# Patient Record
Sex: MALE | Race: BLACK OR AFRICAN AMERICAN
[De-identification: names, ages, dates, MRNs, and addresses within clinical notes are randomized per-mention and may not be internally consistent; named-entity substitution may affect disease eponyms.]

---

## 2021-08-17 ENCOUNTER — HOSPITAL ENCOUNTER (INPATIENT)
Dept: HOSPITAL 61 - ER | Age: 56
LOS: 2 days | Discharge: ANOTHER HOSPITAL | DRG: 199 | End: 2021-08-19
Attending: FAMILY MEDICINE | Admitting: FAMILY MEDICINE
Payer: SELF-PAY

## 2021-08-17 VITALS — BODY MASS INDEX: 15.87 KG/M2 | WEIGHT: 119.71 LBS | HEIGHT: 73 IN

## 2021-08-17 DIAGNOSIS — U07.1: ICD-10-CM

## 2021-08-17 DIAGNOSIS — J98.4: ICD-10-CM

## 2021-08-17 DIAGNOSIS — J43.9: ICD-10-CM

## 2021-08-17 DIAGNOSIS — Z90.01: ICD-10-CM

## 2021-08-17 DIAGNOSIS — J96.01: ICD-10-CM

## 2021-08-17 DIAGNOSIS — N17.0: ICD-10-CM

## 2021-08-17 DIAGNOSIS — F17.200: ICD-10-CM

## 2021-08-17 DIAGNOSIS — J12.82: ICD-10-CM

## 2021-08-17 DIAGNOSIS — J93.9: Primary | ICD-10-CM

## 2021-08-17 DIAGNOSIS — E43: ICD-10-CM

## 2021-08-17 LAB
% ATYL: 4 % (ref 0–0)
% BANDS: 18 % (ref 0–9)
% LYMPHS: 7 % (ref 24–48)
% MONOS: 1 % (ref 0–10)
% SEGS: 70 % (ref 35–66)
ALBUMIN SERPL-MCNC: 2.8 G/DL (ref 3.4–5)
ALP SERPL-CCNC: 58 U/L (ref 46–116)
ALT SERPL-CCNC: 39 U/L (ref 16–63)
ANION GAP SERPL CALC-SCNC: 11 MMOL/L (ref 6–14)
AST SERPL-CCNC: 53 U/L (ref 15–37)
BASOPHILS # BLD AUTO: 0 X10^3/UL (ref 0–0.2)
BASOPHILS NFR BLD: 0 % (ref 0–3)
BILIRUB DIRECT SERPL-MCNC: 0.1 MG/DL (ref 0–0.2)
BILIRUB SERPL-MCNC: 0.3 MG/DL (ref 0.2–1)
BUN SERPL-MCNC: 39 MG/DL (ref 8–26)
CALCIUM SERPL-MCNC: 9.4 MG/DL (ref 8.5–10.1)
CHLORIDE SERPL-SCNC: 97 MMOL/L (ref 98–107)
CO2 SERPL-SCNC: 28 MMOL/L (ref 21–32)
CREAT SERPL-MCNC: 1.5 MG/DL (ref 0.7–1.3)
EOSINOPHIL NFR BLD: 0 % (ref 0–3)
EOSINOPHIL NFR BLD: 0 X10^3/UL (ref 0–0.7)
ERYTHROCYTE [DISTWIDTH] IN BLOOD BY AUTOMATED COUNT: 14.8 % (ref 11.5–14.5)
GFR SERPLBLD BASED ON 1.73 SQ M-ARVRAT: 58.6 ML/MIN
GLUCOSE SERPL-MCNC: 132 MG/DL (ref 70–99)
HCT VFR BLD CALC: 52.9 % (ref 39–53)
HGB BLD-MCNC: 18 G/DL (ref 13–17.5)
LYMPHOCYTES # BLD: 0.8 X10^3/UL (ref 1–4.8)
LYMPHOCYTES NFR BLD AUTO: 7 % (ref 24–48)
MCH RBC QN AUTO: 28 PG (ref 25–35)
MCHC RBC AUTO-ENTMCNC: 34 G/DL (ref 31–37)
MCV RBC AUTO: 82 FL (ref 79–100)
MONO #: 0.5 X10^3/UL (ref 0–1.1)
MONOCYTES NFR BLD: 5 % (ref 0–9)
NEUT #: 10.7 X10^3/UL (ref 1.8–7.7)
NEUTROPHILS NFR BLD AUTO: 89 % (ref 31–73)
PLATELET # BLD AUTO: 170 X10^3/UL (ref 140–400)
PLATELET # BLD EST: ADEQUATE 10*3/UL
POTASSIUM SERPL-SCNC: 4 MMOL/L (ref 3.5–5.1)
PROT SERPL-MCNC: 8.1 G/DL (ref 6.4–8.2)
RBC # BLD AUTO: 6.46 X10^6/UL (ref 4.3–5.7)
SODIUM SERPL-SCNC: 136 MMOL/L (ref 136–145)
WBC # BLD AUTO: 12.1 X10^3/UL (ref 4–11)

## 2021-08-17 PROCEDURE — 71045 X-RAY EXAM CHEST 1 VIEW: CPT

## 2021-08-17 PROCEDURE — 96375 TX/PRO/DX INJ NEW DRUG ADDON: CPT

## 2021-08-17 PROCEDURE — 80076 HEPATIC FUNCTION PANEL: CPT

## 2021-08-17 PROCEDURE — 36415 COLL VENOUS BLD VENIPUNCTURE: CPT

## 2021-08-17 PROCEDURE — 87426 SARSCOV CORONAVIRUS AG IA: CPT

## 2021-08-17 PROCEDURE — 96361 HYDRATE IV INFUSION ADD-ON: CPT

## 2021-08-17 PROCEDURE — 80048 BASIC METABOLIC PNL TOTAL CA: CPT

## 2021-08-17 PROCEDURE — 83880 ASSAY OF NATRIURETIC PEPTIDE: CPT

## 2021-08-17 PROCEDURE — 80053 COMPREHEN METABOLIC PANEL: CPT

## 2021-08-17 PROCEDURE — G0378 HOSPITAL OBSERVATION PER HR: HCPCS

## 2021-08-17 PROCEDURE — 71260 CT THORAX DX C+: CPT

## 2021-08-17 PROCEDURE — 84484 ASSAY OF TROPONIN QUANT: CPT

## 2021-08-17 PROCEDURE — 93005 ELECTROCARDIOGRAM TRACING: CPT

## 2021-08-17 PROCEDURE — 96365 THER/PROPH/DIAG IV INF INIT: CPT

## 2021-08-17 PROCEDURE — 32551 INSERTION OF CHEST TUBE: CPT

## 2021-08-17 PROCEDURE — 85007 BL SMEAR W/DIFF WBC COUNT: CPT

## 2021-08-17 PROCEDURE — 85025 COMPLETE CBC W/AUTO DIFF WBC: CPT

## 2021-08-17 NOTE — RAD
CT chest with contrast



PQRS statement: CT scans at this facility use dose reduction including either automated exposure cont
rol, iterative reconstructions, and /or weight based radiation dosing via mA and kV modification when
 appropriate to reduce radiation dose to as low as reasonably achievable.



Contrast: 60 mL Omnipaque 300 intravenous contrast



HISTORY: Persistent pneumothorax, positive Covid infection, ruptured pneumatocele.



COMPARISON: Prior chest x-ray from 1/17/2021



FINDINGS: There is a right thoracostomy tube in place. There is a massive right-sided pneumothorax wi
th collapse of most of the lung, and leftward shift of heart and mediastinum suggesting tension pneum
othorax, this is similar to recent chest x-rays which have been previously reported. There is pulmona
ry emphysema bilaterally. There is a portion of the right upper lobe anterior segment which demonstra
zuleyka some thin linear bands extending to the posterolateral chest wall could represent some adhesions,
 there is no obvious subpleural bulla with rupture across the visceral pleura to identify a potential
 source of this spontaneous pneumothorax. There is opacity of the collapsed right middle lobe and rig
ht upper lobe surrounding the jax. The left lung is aerated, demonstrates mild groundglass opacities
 at the upper and lower lobes with some areas of greater consolidated density along the posterior lat
eral basilar lower lobe. Trachea and bronchi are unremarkable. This volume of subcentimeter in thickn
ess right pleural fluid at the lower chest. Heart size normal. Aorta, pulmonary vessels and esophagus
 unremarkable. No adenopathy in the chest. Bones are unremarkable.



IMPRESSION:

1. Right thoracostomy tube in place with a persistent large right-sided pneumothorax, partial collaps
e of most of the right lung, and persistent leftward shift of the heart and mediastinum stable to rec
ently reported chest x-rays as described above.

2. Opacities of portions of the right middle lobe and right upper lobe could be due to atelectasis or
 multilobar pneumonia.  

3. Pulmonary emphysema.

 



Electronically signed by: Vargas Nickerson MD (8/17/2021 11:40 PM) [facility]

## 2021-08-17 NOTE — PHYS DOC
Past Medical History


Past Medical History:  No Pertinent History


 (JULIO CESAR MOREIRA DO)


Past Surgical History:  No Surgical History, Other


Additional Past Surgical Histo:  L eye removed


 (JULIO CESAR MOREIRA DO)


Smoking Status:  Never Smoker


Alcohol Use:  None


Drug Use:  None


 (JULIO CESAR MOREIRA DO)





General Adult


EDM:


Chief Complaint:  SHORTNESS OF BREATH





HPI:


HPI:


56-year-old male presents to the emergency department with shortness of breath 

for the last several days worsening today.  He reports that he was recently exp

osed to his wife who was Covid positive last week.  He also admits to a cough 

that is productive.  He denies any chest pain currently.  He further denies any 

trauma or injuries.  The patient denies nausea, vomiting, fever, chills,  

abdominal pain, urinary symptoms,  recent trauma, or any other complaints.  The 

patient denies any history of blood clots in his legs or his lungs, no personal 

history of any cancers or clotting disorders. 


 (JULIO CESAR MOREIRA DO)





Review of Systems:


Review of Systems:


Constitutional: Denies fever or chills. 


Eyes: Denies change in vision, pain.


HENT: Denies congestion or sore throat.


Respiratory: Admits to cough and shortness of breath. 


Cardiovascular: Denies chest pain or edema. 


GI: Denies abdominal pain, nausea. 


: Denies change in urination, dysuria. 


Musculoskeletal: Denies extremity pain, or trauma. 


Skin: Denies rash, skin change. 


Neurologic: Denies headache, focal weakness. 


Psychiatric: Denies depression or anxiety.





All other systems reviewed as negative except for what was mentioned in the HPI.


 (JULIO CESAR MOREIRA DO)





Heart Score:


C/O Chest Pain:  No


 (JULIO CESAR MOREIRA DO)


C/O Chest Pain:  N/A


 (BETTYENIKIA I DO)


C/O Chest Pain:  N/A


 (DANIEL ALLEN T DO)


Family History:


Family History:


Noncontributory


 (JULIO CESAR MOREIRA DO)





Allergies:


Allergies:





Allergies








Coded Allergies Type Severity Reaction Last Updated Verified


 


  No Known Drug Allergies    21 No








 (JULIO CESAR MOREIRA DO)





Physical Exam:


PE:


Constitutional: No acute distress, non-toxic appearance.  Speaking full 

sentences


HENT: Atraumatic, bilateral external ears normal, nose normal.


Eyes: PERRLA, EOMI, conjunctiva normal, no discharge.


Neck: Normal range of motion, supple, no stridor.


Cardiovascular: Heart rate regular rhythm. 2+ radial pulses


Lungs & Thorax: No respiratory distress, symmetrical expansion. 


Abdomen: Soft, no tenderness


Skin: Warm, dry.


Extremities: No tenderness, no cyanosis, ROM intact, no edema.


Neurologic: Alert and oriented X 3, normal motor function, normal sensory 

function, no focal deficits noted. GCS 15.


Psychologic: Affect normal, judgment normal, mood normal.


 (JULIO CESAR MOREIRA DO)





Current Patient Data:


Vital Signs:





                                   Vital Signs








  Date Time  Temp Pulse Resp B/P (MAP) Pulse Ox O2 Delivery O2 Flow Rate FiO2


 


21 17:23 97.9 111 18 106/64 64 Room Air  





 97.9       








 (JULIO CESAR MOREIRA DO)





EKG:


EKG:


Sinus tachycardia rate of 113, peak T waves seen in the lateral and septal 

precordial leads, no ectopic beats, normal axis, normal MT, QRS, and QTc 

intervals. Impression: Peak T waves, possible hyperkalemic changes.  Interpreted

by me, Julio Cesar Moreira D.O. 


 (JULIOC ESAR MOREIRA DO)





Radiology/Procedures:


Radiology/Procedures:


[]


 (JULIO CESAR MOREIRA DO)


Impression:


Findings:





Single upright portable exam of the chest was performed. Heart and mediastinal 

contours within normal limits. There is a large pneumothorax on the right with 

mediastinal shift toward the left. Prominent reticular nodular markings 

throughout the left lung. No pleural effusion. No left-sided pneumothorax.





IMPRESSION:


1. Large right-sided pneumothorax with mediastinal shift, suggesting tension 

pneumothorax.


2. Left-sided airspace disease, atelectasis versus edema or pneumonia.











CT chest with contrast





PQRS statement: CT scans at this facility use dose reduction including either 

automated exposure control, iterative reconstructions, and /or weight based 

radiation dosing via mA and kV modification when appropriate to reduce radiation

 dose to as low as reasonably achievable.





Contrast: 60 mL Omnipaque 300 intravenous contrast





HISTORY: Persistent pneumothorax, positive Covid infection, ruptured 

pneumatocele.





COMPARISON: Prior chest x-ray from 2021





FINDINGS: There is a right thoracostomy tube in place. There is a massive right-

sided pneumothorax with collapse of most of the lung, and leftward shift of 

heart and mediastinum suggesting tension pneumothorax, this is similar to recent

 chest x-rays which have been previously reported. There is pulmonary emphysema 

bilaterally. There is a portion of the right upper lobe anterior segment which 

demonstrates some thin linear bands extending to the posterolateral chest wall 

could represent some adhesions, there is no obvious subpleural bulla with 

rupture across the visceral pleura to identify a potential source of this 

spontaneous pneumothorax. There is opacity of the collapsed right middle lobe 

and right upper lobe surrounding the jax. The left lung is aerated, 

demonstrates mild groundglass opacities at the upper and lower lobes with some 

areas of greater consolidated density along the posterior lateral basilar lower 

lobe. Trachea and bronchi are unremarkable. This volume of subcentimeter in 

thickness right pleural fluid at the lower chest. Heart size normal. Aorta, 

pulmonary vessels and esophagus unremarkable. No adenopathy in the chest. Bones 

are unremarkable.





IMPRESSION:


1. Right thoracostomy tube in place with a persistent large right-sided 

pneumothorax, partial collapse of most of the right lung, and persistent 

leftward shift of the heart and mediastinum stable to recently reported chest x-

rays as described above.


2. Opacities of portions of the right middle lobe and right upper lobe could be 

due to atelectasis or multilobar pneumonia.  


3. Pulmonary emphysema.


 


 (NIKIA BRADFORD DO)


Radiology/Procedures:


                                        


                                        


                                        


                            [facility]


                    [address]  [place], [place] [id number] [phone]


                                        


                                 IMAGING REPORT





                                     Signed





PATIENT: KHADIJAH SAUL  ACCOUNT: [id number]     MRN#: [id number]


: 1965           LOCATION: ER              AGE: 56


SEX: M                    EXAM DT: 21         ACCESSION#: [id number]


STATUS: REG ER            ORD. PHYSICIAN: DANIEL ALLEN DO


REASON: RIGHT SIDE PNEUMOTHORAX, CHEST TUBE IN PLACE


PROCEDURE: CHEST AP ONLY





XR CHEST 1V





History: Reason: RIGHT SIDE PNEUMOTHORAX, CHEST TUBE IN PLACE / Spl. 

Instructions:  / History: 





Comparison: 2021





Findings:


Decreased right pneumothorax with large residual. Right thoracostomy tube 

projecting over the mid chest. Diffuse interstitial changes. Small right pleural

 effusion, increased compared to prior. Unchanged heart size. Decreased leftward

 mediastinal shift. Increased right chest wall subcutaneous gas.





Impression: 


1.  Decreased right pneumothorax with decreased leftward mediastinal shift. 

Large residual pneumothorax. Interval repositioning of right thoracostomy tube.


2.  Increased small right pleural effusion.





Electronically signed by: Mingo Kincaid DO (2021 9:07 AM) [id number]














DICTATED and SIGNED BY:     MINGO KINCAID DO


DATE:     21 [id number]








                            [facility]


                    [address] [place] [address]


                                 [phone]


                                        


                                 IMAGING REPORT





                                     Signed





PATIENT: KHADIJAH SAUL  ACCOUNT: [id number]     MRN#: [id number]


: 1965           LOCATION: ER              AGE: 56


SEX: M                    EXAM DT: 21         ACCESSION#: [id number]


STATUS: REG ER            ORD. PHYSICIAN: DANIEL ALLEN DO


REASON: right side pneumothorax, s/p chest tube placement, evaluate for 

improvement


PROCEDURE: CHEST AP ONLY





XR CHEST 1V





Clinical Indication: Reason: right side pneumothorax, s/p chest tube placement, 

evaluate for improvement / 





Comparison:  AP chest, earlier same day.





Findings: 


Mid hemithorax right chest tube is in similar position. Large right pneumothorax

 but mildly improved from prior study. Diffuse left lung interstitial opacities 

are unchanged. Leftward mediastinal shift is mildly improved. Right chest wall 

subcutaneous air is similar, incompletely imaged. Small right pleural effusion 

is unchanged.





IMPRESSION: 


1.  Stable right chest tube. Large pneumothorax is mildly improved from prior 

study. Leftward mediastinal shift is mildly improved. Small right pleural 

effusion is unchanged.


2.  Diffuse left lung interstitial opacities are unchanged.








Electronically signed by: Bogdan Delarosa MD (2021 2:56 PM) [id number]














DICTATED and SIGNED BY:     BOGDAN DELAROSA MD


DATE:     21 [id number] 0


 (DANIEL ALLEN DO)


Course & Med Decision Making:


Course & Med Decision Making


The patient was seen emergently by me due to a hypoxic SPO2 reading in triage of

 64% with good waveform at 1730.  The patient was placed on a nonrebreather at 

15 L which improved his O2 saturations.  His initial EKG showed sinus 

tachycardia with possible hyperkalemic changes.  2 g of calcium gluconate were 

ordered.  Patient was signed out to Dr. Bradford at 1800 pending workup and likely

 admisison





Critical care time was 30 minutes which includes time at bedside, spent in d

iscussion of patient's care with specialists and/or family members, with 

interpretation of laboratory and/or radiological studies and is exclusive of 

procedures.





Upon chart review, patient with large PTX likely causing his hypoxia, 

complicated by COVID. CXR was not immediately available to review prior to my 

sign out to oncoming physician. Per documentation, radiologist discussed 

critical finding with Dr. Bradford at 1806


 (JULIO CESAR MOREIRA DO)


Course & Med Decision Making


Assumed care at shift change- Disposition with probable admission.


Labs and radiologic imaging pending at shift change.





Discussed Xray finds with Radiologist.


Large right pneumothorax- suggestive of tension.


Emergent pigtail chest tube placed.








Procedure-


Procedure #1


Pigtail chest tube prepackaged kit used


RIght mid axillary line lateral to right nipple


Betadine was used to clean the area.  Sterile towels were placed around site.  I

 did also use


Lidocaine with EPI used for local anesthesia.


Once successful anesthesia was achieved an incision that with was made over the 

fourth lateral rib.


Dissection down to the rib with finger.


Used needle chest tube introducer to place tube in the right chest.


Introducer was removed.


Pigtail catheter was sutured to the chest using device


Petroleum jelly dressing placed


Repeat chest xray post placement.


Per in acute change in pneumothorax





Procedure #2


Pigtail chest tube prepackaged kit used


RIght mid axillary line lateral to right nipple





Betadine was used to clean the area.  Sterile towels were placed around site. 


Lidocaine with EPI used for local anesthesia.


Once successful anesthesia was achieved 


Previous pigtail chest tube removed.


Dissection down to the rib with finger.


2nd pigtail cath chest tube placed--  redirected posteriorly.


Introducer was removed.


Pigtail catheter was sutured to the chest using device


Petroleum jelly dressing placed


Pigtail placed to vacuum seal


-- No air leak,-- no bubbling


Repeat chest xray post placement.


2nd Chest Xray no acute change in pneumothorax





Procedure #3


Chest tube kit- hospital pre-made


24FR chest tube





Betadine was used to clean the area.  


Sterile towels were placed around site. 


Sterile prepackaged site became in the package. 


Lidocaine with EPI used for local anesthesia. 


Once successful anesthesia was achieved  


Previous pigtail chest tube #2 was removed 


Dissection down to the rib with finger


Positive air flow out chest wall


Chest tube placed 23FR 


Condensation in tube.


Petroleum jelly dressing placed


Chest tube placed to vacuum seal


- No air leak,-- no bubbling


Repeat chest xray post placement.


2nd Chest Xray no acute change in pneumothorax





Discussed patient pulmonology-- Dr Brito-


Recommends transfer to facility that has Thoracic Surgery Capability.





Attempted Transfer-


[facility] - declined transfer due to capacity


[facility] - declined transfer due to capacity


[facility] - declined transfer due to capacity


[facility] - transfer due to capacity


[facility] - No CT surgery





[facility]-- Discussed patient with Hospitalist NP-


Willing to accept patient but currently does not have a COVID bed available.








0028hrs-


Patients stable.


Vital signs HR 74 bmp


O2 95% on 15L NRB


Reviewed CT Chest--


CT Chest-- no acute change when compared to last CXR.


NS 1L added.








 0145hrs-


Re-evaluated


Patient stable with no complaints


15L NRB 96%


Chest tube connected to suction @ 20.


Patient states breathing improved when placed to suction.


Accepting hospital pending.








0435hrs


Re-evaluation


Patient stable


15LNrb 94%


Heart rate 68


Chest tube connected to suction @ 20


Patient with no complaints





Contacted for transfer (initial)


[facility] - declined transfer due to capacity


[facility] - declined transfer due to capacity


[facility]- declined transfer due to capacity


[facility] - declined transfer due to capacity





RE- Contacted hospitals for transfer


[facility] - declined transfer due to capacity 


[facility] - declined transfer due to capacity


[facility] - declined transfer due to capacity


[facility] - transfer due to capacity














0544hrs


Re-evaluation


Patient stable


15LNrb 97%


Heart rate 74


Chest tube connected to suction @ 20


Patient with no complaints


Placement/Transfer pending.


Patient signed out to Dr Allen











Re-ASSUMED Care at shift change


1800HRS


Per signed out from Dr Allen patient to be accepted by [facility].


I called [facility] to give report--- Discussed patient with House Supervisor and she 

is requesting ER Documentation.


ER Documentation fax to number provided.





5728hrs


House supervisor informed me that patient not accepted to [facility].


Will need to Admit here to [facility]





1845hrs


Discussed patient with Marina CARDENAS-- Plan to admit patient to [facility] then 

will do a hospital to hospital 


transfer to [facility] once bed is available.


 (NIKIA BRADFORD I DO)


Course & Med Decision Making


I assumed care of this patient as 6 AM from Dr. Nikia Bradford, awaiting for 

transfer to another facility due to large right side pneumothorax that is not 

resolving with chest tube placement, need cardiothoracis surgery evaluation.  





At 8AM, discussed with Dr. Preez's nurse, cardiothoracic surgeon at [facility], 

TOLD THAT HE IS IN THE OR AT THIS TIME, NOT ABLE TO DISCUSS, WILL TAKE DOWN 

INFORMATION, HE WILL CALL WHENEVER HE IS OUT OF THE OR.





Repeated chest xray shown improved lung size but still has a large pneumothorax,

 discussed with pulmonologist, Dr. BRITO, who recommended to keep patient in the 

ER, need to transfer to another facility with cardiothoracis surgery.  





AT 11:17, This physician called [facility] for transfer, there is no 

bed available so transfer was declined. 





CALLED [facility], transfer center at  310 pm, discussed about 

availability of accepting patient there, informed that they have no COVID-19 ICU

 bed, declined transfer at this time.  





at 5:30 PM ON 21 I discussed with the cardiothoracic surgeon at [facility], 

Dr. Favio Alberto who recommended to advance the chest tube to apical, willing to 

see patient at [facility] IF THE HOSPITALIST ACCEPT PATIENT FOR TRANSFER THERE.  IF

 THERE IS NO BED, PATIENT CAN BE ADMITTED HERE AND THEN TRANSFER LATER.  





I discussed with Dr. Brito, pulmonologist here, agreed to see patient if he is 

admitted here.





I discussed with Dr. Gonzalo Santos, hospitalist, agreed to admit patient here.





At 6:10 pm, house supervisor [facility] Marina Gut[facility]rez, 

Informed that [facility] may have a bed for patient....








Patient's chest tube was advanced by this physician to apical position..





Patient's care is endorsed to Dr. Nikia Bradford at shift change, awaiting 

transfer to [facility]. 





 (DANIEL ALLEN DO)





Departure


Departure


Impression:  


   Primary Impression:  


   Hypoxia


   Additional Impressions:  


   COVID


   Pneumothorax on right


Disposition:  09 ADMITTED AS INPATIENT


Admitting Physician:  HIMS


 (JULIO CESAR MOREIRA DO)











JULIO CESAR MOREIRA DO             Aug 17, 2021 17:46


NIKIA BRADFORD DO            Aug 17, 2021 18:51


DANIEL ALLEN DO                Aug 18, 2021 11:24

## 2021-08-17 NOTE — RAD
Single view chest dated 8/17/2021 6:03 PM:



COMPARISON: None



Clinical Indication: Shortness of breath.



Findings:



Single upright portable exam of the chest was performed. Heart and mediastinal contours within normal
 limits. There is a large pneumothorax on the right with mediastinal shift toward the left. Prominent
 reticular nodular markings throughout the left lung. No pleural effusion. No left-sided pneumothorax
.



IMPRESSION:

1. Large right-sided pneumothorax with mediastinal shift, suggesting tension pneumothorax.

2. Left-sided airspace disease, atelectasis versus edema or pneumonia.





**Results discussed with ER physician at approximately 6:06 PM on the day of the study.



Electronically signed by: Antonio Rai MD (8/17/2021 6:06 PM) AMAN

## 2021-08-17 NOTE — RAD
Single view chest dated 8/17/2021 7:58 PM:



COMPARISON: Study dated same day.



Clinical Indication: Chest tube placement.



Findings:



Single upright portable exam of the chest was performed. Large right-sided pneumothorax with complete
 collapse of the right lung, unchanged. There is mild mediastinal shift to the right. Smallbore chest
 tube has slightly changed in position, however the pneumothorax is not significant changed in size. 
Patchy airspace disease throughout the left lung, stable.



IMPRESSION:

1. No significant interval change in large right-sided pneumothorax after chest tube adjustment.



Electronically signed by: Antonio Rai MD (8/17/2021 8:01 PM) AMAN

## 2021-08-17 NOTE — RAD
Chest AP only at 2025:



Reason for examination: Chest tube placement.



Comparison is made to previous study dated 8/17/2021 at 1934.



A large bore right chest tube is now present. There continues to be a large right pneumothorax with c
ollapse of the right lung which is unchanged. There continue to be interstitial and alveolar opacitie
s throughout the left lung field which show no change. The mediastinum shows a slight shift of midlin
e to the left. No pleural effusions are seen. The heart size is normal. No acute bony abnormalities a
re seen.



IMPRESSION:



Large right pneumothorax which is unchanged despite the large bore chest tube. 

Collapse of the right lung field with mild shift of mediastinum to the left suggesting tension pneumo
thorax.

Continued presence of diffuse opacities throughout the left lung field without interval change.



Electronically signed by: Meghan Goodson MD (8/17/2021 8:49 PM) RACHELL

## 2021-08-17 NOTE — RAD
Exam: Chest one view



INDICATION: Chest tube placement



TECHNIQUE: Frontal view of the chest



Comparisons: None



FINDINGS:

Interval placement of a right-sided chest tube. There is persistent large right pneumothorax. There m
ay be mild decreased mediastinal shift with persistent complete collapse of the right lung.



IMPRESSION:

Interval placement of a chest tube with persistent complete collapse of the right lung. Exam is essen
tially unchanged from prior. Suspect subcutaneous placement of the chest tube.





**********FOR INTERNAL CODING PURPOSES**********



Critical result:



Findings discussed with  ER physician at 8/17/2021 6:53 PM.



RESULT CODE: (C)  



  







Electronically signed by: Sravan Tomlinson MD (8/17/2021 6:53 PM) MAGDALENA

## 2021-08-18 VITALS — DIASTOLIC BLOOD PRESSURE: 70 MMHG | SYSTOLIC BLOOD PRESSURE: 103 MMHG

## 2021-08-18 VITALS — SYSTOLIC BLOOD PRESSURE: 115 MMHG | DIASTOLIC BLOOD PRESSURE: 83 MMHG

## 2021-08-18 PROCEDURE — 0W9930Z DRAINAGE OF RIGHT PLEURAL CAVITY WITH DRAINAGE DEVICE, PERCUTANEOUS APPROACH: ICD-10-PCS | Performed by: STUDENT IN AN ORGANIZED HEALTH CARE EDUCATION/TRAINING PROGRAM

## 2021-08-18 NOTE — EKG
[facility]

              [address] [place] [address]

Test Date:    2021               Test Time:    17:33:05

Pat Name:     KHADIJAH SAUL          Department:   

Patient ID:   [id number]           Room:          

Gender:       M                        Technician:   

:          1965               Requested By: CHRISTINE BROOKS

Order Number: [id number]           Reading MD:     

                                 Measurements

Intervals                              Axis          

Rate:         113                      P:            96

SC:           140                      QRS:          100

QRSD:         86                       T:            69

QT:           310                                    

QTc:          431                                    

                           Interpretive Statements

SINUS TACHYCARDIA

RIGHTWARD AXIS

R-S TRANSITION ZONE IN V LEADS DISPLACED TO THE LEFT

NO SPECIFIC ECG ABNORMALITIES

RI6.02

No previous ECG available for comparison

## 2021-08-18 NOTE — RAD
XR CHEST 1V



History: Reason: RIGHT SIDE PNEUMOTHORAX, CHEST TUBE IN PLACE / Spl. Instructions:  / History: 



Comparison: August 17, 2021



Findings:

Decreased right pneumothorax with large residual. Right thoracostomy tube projecting over the mid micheal
st. Diffuse interstitial changes. Small right pleural effusion, increased compared to prior. Unchange
d heart size. Decreased leftward mediastinal shift. Increased right chest wall subcutaneous gas.



Impression: 

1.  Decreased right pneumothorax with decreased leftward mediastinal shift. Large residual pneumothor
ax. Interval repositioning of right thoracostomy tube.

2.  Increased small right pleural effusion.



Electronically signed by: Mingo Kincaid DO (8/18/2021 9:07 AM) [id number]
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
## 2021-08-18 NOTE — RAD
Single view chest dated 8/18/2021 6:31 PM:



COMPARISON: 8/18/2021



Clinical Indication: Chest tube advancement.



Findings:



Single upright portable exam of the chest was performed. Heart and mediastinal contours are stable. T
here has been interval repositioning of right-sided chest tube. Moderate size right-sided pneumothora
x is similar in size given differences in technique. Subcutaneous emphysema at the lower right neck a
nd right chest wall, mildly increased. There is patchy airspace disease throughout the left lung, unc
hanged.



IMPRESSION:

1. No significant interval change in moderate size right pneumothorax is seen.

2. Left-sided airspace disease, unchanged.



Electronically signed by: Antonio Rai MD (8/18/2021 6:32 PM) AMAN

## 2021-08-18 NOTE — RAD
XR CHEST 1V



Clinical Indication: Reason: right side pneumothorax, s/p chest tube placement, evaluate for improvem
ent / 



Comparison:  AP chest, earlier same day.



Findings: 

Mid hemithorax right chest tube is in similar position. Large right pneumothorax but mildly improved 
from prior study. Diffuse left lung interstitial opacities are unchanged. Leftward mediastinal shift 
is mildly improved. Right chest wall subcutaneous air is similar, incompletely imaged. Small right pl
eural effusion is unchanged.



IMPRESSION: 

1.  Stable right chest tube. Large pneumothorax is mildly improved from prior study. Leftward mediast
inal shift is mildly improved. Small right pleural effusion is unchanged.

2.  Diffuse left lung interstitial opacities are unchanged.





Electronically signed by: Bogdan Delarosa MD (8/18/2021 2:56 PM) [id number]

## 2021-08-19 ENCOUNTER — HOSPITAL ENCOUNTER (INPATIENT)
Dept: HOSPITAL 35 - 3W | Age: 56
LOS: 22 days | Discharge: HOME HEALTH | DRG: 163 | End: 2021-09-10
Attending: HOSPITALIST | Admitting: HOSPITALIST
Payer: COMMERCIAL

## 2021-08-19 VITALS — DIASTOLIC BLOOD PRESSURE: 69 MMHG | SYSTOLIC BLOOD PRESSURE: 103 MMHG

## 2021-08-19 VITALS — SYSTOLIC BLOOD PRESSURE: 106 MMHG | DIASTOLIC BLOOD PRESSURE: 75 MMHG

## 2021-08-19 VITALS — SYSTOLIC BLOOD PRESSURE: 123 MMHG | DIASTOLIC BLOOD PRESSURE: 71 MMHG

## 2021-08-19 VITALS — DIASTOLIC BLOOD PRESSURE: 70 MMHG | SYSTOLIC BLOOD PRESSURE: 107 MMHG

## 2021-08-19 VITALS — SYSTOLIC BLOOD PRESSURE: 130 MMHG | DIASTOLIC BLOOD PRESSURE: 85 MMHG

## 2021-08-19 VITALS — HEIGHT: 72.99 IN | WEIGHT: 127.4 LBS | BODY MASS INDEX: 16.89 KG/M2

## 2021-08-19 VITALS — SYSTOLIC BLOOD PRESSURE: 128 MMHG | DIASTOLIC BLOOD PRESSURE: 71 MMHG

## 2021-08-19 DIAGNOSIS — Z80.3: ICD-10-CM

## 2021-08-19 DIAGNOSIS — J12.82: ICD-10-CM

## 2021-08-19 DIAGNOSIS — U07.1: Primary | ICD-10-CM

## 2021-08-19 DIAGNOSIS — F17.210: ICD-10-CM

## 2021-08-19 DIAGNOSIS — J96.21: ICD-10-CM

## 2021-08-19 DIAGNOSIS — R79.89: ICD-10-CM

## 2021-08-19 DIAGNOSIS — Z79.899: ICD-10-CM

## 2021-08-19 DIAGNOSIS — J44.0: ICD-10-CM

## 2021-08-19 DIAGNOSIS — D72.829: ICD-10-CM

## 2021-08-19 DIAGNOSIS — I47.1: ICD-10-CM

## 2021-08-19 DIAGNOSIS — E43: ICD-10-CM

## 2021-08-19 DIAGNOSIS — I10: ICD-10-CM

## 2021-08-19 DIAGNOSIS — D64.9: ICD-10-CM

## 2021-08-19 DIAGNOSIS — J93.0: ICD-10-CM

## 2021-08-19 LAB
ALBUMIN SERPL-MCNC: 2.3 G/DL (ref 3.4–5)
ALBUMIN SERPL-MCNC: 2.4 G/DL (ref 3.4–5)
ALBUMIN/GLOB SERPL: 0.5 {RATIO} (ref 1–1.7)
ALP SERPL-CCNC: 59 U/L (ref 46–116)
ALT SERPL-CCNC: 48 U/L (ref 16–63)
ALT SERPL-CCNC: 54 U/L (ref 30–65)
ANION GAP SERPL CALC-SCNC: 10 MMOL/L (ref 6–14)
ANION GAP SERPL CALC-SCNC: 8 MMOL/L (ref 7–16)
AST SERPL-CCNC: 53 U/L (ref 15–37)
AST SERPL-CCNC: 54 U/L (ref 15–37)
BASOPHILS # BLD AUTO: 0 X10^3/UL (ref 0–0.2)
BASOPHILS NFR BLD: 0 % (ref 0–3)
BILIRUB SERPL-MCNC: 0.3 MG/DL (ref 0.2–1)
BILIRUB SERPL-MCNC: 0.3 MG/DL (ref 0.2–1)
BUN SERPL-MCNC: 23 MG/DL (ref 7–18)
BUN SERPL-MCNC: 23 MG/DL (ref 8–26)
BUN/CREAT SERPL: 29 (ref 6–20)
CALCIUM SERPL-MCNC: 8.9 MG/DL (ref 8.5–10.1)
CALCIUM SERPL-MCNC: 9.4 MG/DL (ref 8.5–10.1)
CHLORIDE SERPL-SCNC: 102 MMOL/L (ref 98–107)
CHLORIDE SERPL-SCNC: 106 MMOL/L (ref 98–107)
CO2 SERPL-SCNC: 28 MMOL/L (ref 21–32)
CO2 SERPL-SCNC: 29 MMOL/L (ref 21–32)
CREAT SERPL-MCNC: 0.8 MG/DL (ref 0.7–1.3)
CREAT SERPL-MCNC: 0.9 MG/DL (ref 0.7–1.3)
EOSINOPHIL NFR BLD: 0 % (ref 0–3)
EOSINOPHIL NFR BLD: 0 X10^3/UL (ref 0–0.7)
ERYTHROCYTE [DISTWIDTH] IN BLOOD BY AUTOMATED COUNT: 14.8 % (ref 10.5–14.5)
ERYTHROCYTE [DISTWIDTH] IN BLOOD BY AUTOMATED COUNT: 15 % (ref 11.5–14.5)
GFR SERPLBLD BASED ON 1.73 SQ M-ARVRAT: 121 ML/MIN
GLUCOSE SERPL-MCNC: 115 MG/DL (ref 70–99)
GLUCOSE SERPL-MCNC: 116 MG/DL (ref 74–106)
HCT VFR BLD CALC: 45.1 % (ref 42–52)
HCT VFR BLD CALC: 46.6 % (ref 39–53)
HGB BLD-MCNC: 14.8 GM/DL (ref 14–18)
HGB BLD-MCNC: 15.5 G/DL (ref 13–17.5)
LYMPHOCYTES # BLD: 0.8 X10^3/UL (ref 1–4.8)
LYMPHOCYTES NFR BLD AUTO: 7 % (ref 24–48)
MCH RBC QN AUTO: 27 PG (ref 25–35)
MCH RBC QN AUTO: 27 PG (ref 26–34)
MCHC RBC AUTO-ENTMCNC: 32.8 G/DL (ref 28–37)
MCHC RBC AUTO-ENTMCNC: 33 G/DL (ref 31–37)
MCV RBC AUTO: 82 FL (ref 79–100)
MCV RBC: 82.2 FL (ref 80–100)
MONO #: 1 X10^3/UL (ref 0–1.1)
MONOCYTES NFR BLD: 8 % (ref 0–9)
NEUT #: 10.7 X10^3/UL (ref 1.8–7.7)
NEUTROPHILS NFR BLD AUTO: 86 % (ref 31–73)
PLATELET # BLD AUTO: 239 X10^3/UL (ref 140–400)
PLATELET # BLD: 272 THOU/UL (ref 150–400)
POTASSIUM SERPL-SCNC: 4.7 MMOL/L (ref 3.5–5.1)
POTASSIUM SERPL-SCNC: 5.4 MMOL/L (ref 3.5–5.1)
PROT SERPL-MCNC: 7.4 G/DL (ref 6.4–8.2)
PROT SERPL-MCNC: 7.4 G/DL (ref 6.4–8.2)
RBC # BLD AUTO: 5.49 MIL/UL (ref 4.5–6)
RBC # BLD AUTO: 5.67 X10^6/UL (ref 4.3–5.7)
SODIUM SERPL-SCNC: 139 MMOL/L (ref 136–145)
SODIUM SERPL-SCNC: 144 MMOL/L (ref 136–145)
WBC # BLD AUTO: 12.5 X10^3/UL (ref 4–11)
WBC # BLD AUTO: 13.3 THOU/UL (ref 4–11)

## 2021-08-19 PROCEDURE — 62110: CPT

## 2021-08-19 PROCEDURE — 50403: CPT

## 2021-08-19 PROCEDURE — 10879: CPT

## 2021-08-19 PROCEDURE — 52266: CPT

## 2021-08-19 PROCEDURE — 52265 CYSTOSCOPY AND TREATMENT: CPT

## 2021-08-19 PROCEDURE — 56528: CPT

## 2021-08-19 PROCEDURE — 50739: CPT

## 2021-08-19 PROCEDURE — 10078: CPT

## 2021-08-19 PROCEDURE — 10081 I&D PILONIDAL CYST COMP: CPT

## 2021-08-19 PROCEDURE — 10203: CPT

## 2021-08-19 PROCEDURE — 56462: CPT

## 2021-08-19 PROCEDURE — 56524: CPT

## 2021-08-19 PROCEDURE — 56525: CPT

## 2021-08-19 PROCEDURE — 65129: CPT

## 2021-08-19 PROCEDURE — 56526: CPT

## 2021-08-19 PROCEDURE — 50101: CPT

## 2021-08-19 PROCEDURE — 50455: CPT

## 2021-08-19 PROCEDURE — 56527: CPT

## 2021-08-19 PROCEDURE — XW033E5 INTRODUCTION OF REMDESIVIR ANTI-INFECTIVE INTO PERIPHERAL VEIN, PERCUTANEOUS APPROACH, NEW TECHNOLOGY GROUP 5: ICD-10-PCS | Performed by: STUDENT IN AN ORGANIZED HEALTH CARE EDUCATION/TRAINING PROGRAM

## 2021-08-19 PROCEDURE — 50654: CPT

## 2021-08-19 PROCEDURE — 54118: CPT

## 2021-08-19 PROCEDURE — 50386 REMOVE STENT VIA TRANSURETH: CPT

## 2021-08-19 PROCEDURE — 62900: CPT

## 2021-08-19 PROCEDURE — 5A0955A ASSISTANCE WITH RESPIRATORY VENTILATION, GREATER THAN 96 CONSECUTIVE HOURS, HIGH NASAL FLOW/VELOCITY: ICD-10-PCS | Performed by: HOSPITALIST

## 2021-08-19 PROCEDURE — 52138: CPT

## 2021-08-19 PROCEDURE — 58585: CPT

## 2021-08-19 PROCEDURE — 51301: CPT

## 2021-08-19 PROCEDURE — 65020: CPT

## 2021-08-19 PROCEDURE — 58870: CPT

## 2021-08-19 PROCEDURE — XW033H5 INTRODUCTION OF TOCILIZUMAB INTO PERIPHERAL VEIN, PERCUTANEOUS APPROACH, NEW TECHNOLOGY GROUP 5: ICD-10-PCS
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
## 2021-08-19 NOTE — RAD
EXAM: AP View of the chest



DATE: 8/19/2021 7:41 AM



INDICATION: Reason: PTX / Spl. Instructions:  / History: 



COMPARISON: 8/18/2021



FINDINGS:

Right chest tubes in stable position. Moderate-sized right pneumothorax, stable.



Heart is mildly enlarged. Aorta is tortuous with atherosclerotic calcifications.



Heterogeneous lung airspace opacities are also grossly stable.



No pleural effusion.



IMPRESSION:

Moderate right pneumothorax, stable in size, with chest tube in place.



Electronically signed by: Remi Bernstein MD (8/19/2021 11:06 AM) [id number]

## 2021-08-19 NOTE — NUR
SS following for discharge planning. SS reviewed pt chart and discussed with pt RN. Pt is 
from home and is currently on 15 liter non-rebreather mask. COVID19 positive. Pt on IV 
Remdesivir, IV Decadron, and IV Rocephin. Pt needing Cardiothoracic surgeon. Pt accepted at 
[facility], [address] [facility], [address]. Room 358. Report# 
[phone]. Packet and ambulance form on the chart.
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
## 2021-08-20 VITALS — SYSTOLIC BLOOD PRESSURE: 103 MMHG | DIASTOLIC BLOOD PRESSURE: 67 MMHG

## 2021-08-20 VITALS — SYSTOLIC BLOOD PRESSURE: 113 MMHG | DIASTOLIC BLOOD PRESSURE: 59 MMHG

## 2021-08-20 VITALS — SYSTOLIC BLOOD PRESSURE: 105 MMHG | DIASTOLIC BLOOD PRESSURE: 61 MMHG

## 2021-08-20 VITALS — DIASTOLIC BLOOD PRESSURE: 65 MMHG | SYSTOLIC BLOOD PRESSURE: 106 MMHG

## 2021-08-20 VITALS — SYSTOLIC BLOOD PRESSURE: 113 MMHG | DIASTOLIC BLOOD PRESSURE: 72 MMHG

## 2021-08-20 VITALS — SYSTOLIC BLOOD PRESSURE: 115 MMHG | DIASTOLIC BLOOD PRESSURE: 82 MMHG

## 2021-08-20 LAB
ALBUMIN SERPL-MCNC: 2.2 G/DL (ref 3.4–5)
ALT SERPL-CCNC: 53 U/L (ref 16–63)
ANION GAP SERPL CALC-SCNC: 13 MMOL/L (ref 7–16)
AST SERPL-CCNC: 46 U/L (ref 15–37)
BILIRUB DIRECT SERPL-MCNC: 0.1 MG/DL
BILIRUB SERPL-MCNC: 0.4 MG/DL (ref 0.2–1)
BUN SERPL-MCNC: 22 MG/DL (ref 7–18)
CALCIUM SERPL-MCNC: 8.5 MG/DL (ref 8.5–10.1)
CHLORIDE SERPL-SCNC: 105 MMOL/L (ref 98–107)
CO2 SERPL-SCNC: 23 MMOL/L (ref 21–32)
CREAT SERPL-MCNC: 0.7 MG/DL (ref 0.7–1.3)
GLUCOSE SERPL-MCNC: 81 MG/DL (ref 74–106)
PHOSPHATE SERPL-MCNC: 3.1 MG/DL (ref 2.6–4.7)
POTASSIUM SERPL-SCNC: 4.9 MMOL/L (ref 3.5–5.1)
PROT SERPL-MCNC: 6.2 G/DL (ref 6.4–8.2)
SODIUM SERPL-SCNC: 141 MMOL/L (ref 136–145)

## 2021-08-20 PROCEDURE — 0BNC4ZZ RELEASE RIGHT UPPER LUNG LOBE, PERCUTANEOUS ENDOSCOPIC APPROACH: ICD-10-PCS | Performed by: THORACIC SURGERY (CARDIOTHORACIC VASCULAR SURGERY)

## 2021-08-20 PROCEDURE — 0W9940Z DRAINAGE OF RIGHT PLEURAL CAVITY WITH DRAINAGE DEVICE, PERCUTANEOUS ENDOSCOPIC APPROACH: ICD-10-PCS

## 2021-08-20 PROCEDURE — XW033E5 INTRODUCTION OF REMDESIVIR ANTI-INFECTIVE INTO PERIPHERAL VEIN, PERCUTANEOUS APPROACH, NEW TECHNOLOGY GROUP 5: ICD-10-PCS | Performed by: HOSPITALIST

## 2021-08-20 PROCEDURE — 3E0L4GC INTRODUCTION OF OTHER THERAPEUTIC SUBSTANCE INTO PLEURAL CAVITY, PERCUTANEOUS ENDOSCOPIC APPROACH: ICD-10-PCS

## 2021-08-20 PROCEDURE — 0BBC4ZZ EXCISION OF RIGHT UPPER LUNG LOBE, PERCUTANEOUS ENDOSCOPIC APPROACH: ICD-10-PCS

## 2021-08-20 NOTE — NUR
Chart review. Unable to visit with yael. No anticipated discharge over the
weekend. Will cont following as needed.

## 2021-08-20 NOTE — NUR
PROGRESS
 
PT A/O X4, UP WITH SBA NOT OOB THIS SHIFT. DENIES PAIN. CHEST TUBE TO RIGHT
LATERAL FLANK INTACT DRSG REMAINS C/D/I NO LEAK AT DRSG SITE NOTED. ATRIUM
WITH CONSTANT TURBULENT BUBBLES AND TIDALING NOTED. PT'S LUNGS MOSTLY
DIMINISHED BUT SOME CRACKLES NOTED ONLY IN RIGHT UPPER LOBE PT DENIES COUGH.
VOIDING PER URINAL. CONSENTS IN  AWAITING TO BE SIGNED PT STATED HE HAS
NOT YET SPOKEN TO SURGEON. ON 13 LITERS
O2 VIA HIGHFLOW NC. SATS IN MID TO UPPER
90'S. PT AWAKE ALL NIGHT WATCHING TV. DISCUSSED PENDING SURGERY. PT CALM AND
RELAXED.

## 2021-08-20 NOTE — NUR
PATIENT ADMITTED TO ICU POST THORACOTOMY. RIGHT SIDE CHEST TUBE WITH TIDALING
TO -20 SUCTION. 25 ML DRAINAGE. RIGHT RADIAL RAMONA - STABLE PRESSURES. CHANGED
FROM 15 L NRB TO 15 L HIGH FLOW NASAL CANNULA BY RESPIRATORY. TITRATE OXYGEN
AS TOLERATED. SATS IN HIGH 90'S. PATIENT DENIES ANY PAIN OR DISCOMFORT.
STARTED ON PCA PUMP AS PER ORDERS. FLUIDS INFUSING. IV ABX GIVEN. LYNCH IN
PLACE. CALL LIGHT IN REACH. PATIENT DENIES ANY NEEDS OR CONCERNS.
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
## 2021-08-21 VITALS — SYSTOLIC BLOOD PRESSURE: 110 MMHG | DIASTOLIC BLOOD PRESSURE: 73 MMHG

## 2021-08-21 VITALS — SYSTOLIC BLOOD PRESSURE: 99 MMHG | DIASTOLIC BLOOD PRESSURE: 72 MMHG

## 2021-08-21 VITALS — SYSTOLIC BLOOD PRESSURE: 100 MMHG | DIASTOLIC BLOOD PRESSURE: 62 MMHG

## 2021-08-21 VITALS — SYSTOLIC BLOOD PRESSURE: 95 MMHG | DIASTOLIC BLOOD PRESSURE: 62 MMHG

## 2021-08-21 VITALS — SYSTOLIC BLOOD PRESSURE: 115 MMHG | DIASTOLIC BLOOD PRESSURE: 79 MMHG

## 2021-08-21 VITALS — DIASTOLIC BLOOD PRESSURE: 73 MMHG | SYSTOLIC BLOOD PRESSURE: 103 MMHG

## 2021-08-21 VITALS — DIASTOLIC BLOOD PRESSURE: 71 MMHG | SYSTOLIC BLOOD PRESSURE: 118 MMHG

## 2021-08-21 VITALS — DIASTOLIC BLOOD PRESSURE: 72 MMHG | SYSTOLIC BLOOD PRESSURE: 108 MMHG

## 2021-08-21 VITALS — SYSTOLIC BLOOD PRESSURE: 117 MMHG | DIASTOLIC BLOOD PRESSURE: 74 MMHG

## 2021-08-21 VITALS — SYSTOLIC BLOOD PRESSURE: 114 MMHG | DIASTOLIC BLOOD PRESSURE: 78 MMHG

## 2021-08-21 VITALS — DIASTOLIC BLOOD PRESSURE: 72 MMHG | SYSTOLIC BLOOD PRESSURE: 93 MMHG

## 2021-08-21 VITALS — DIASTOLIC BLOOD PRESSURE: 73 MMHG | SYSTOLIC BLOOD PRESSURE: 106 MMHG

## 2021-08-21 LAB
ALBUMIN SERPL-MCNC: 2 G/DL (ref 3.4–5)
ALT SERPL-CCNC: 43 U/L (ref 30–65)
ANION GAP SERPL CALC-SCNC: 10 MMOL/L (ref 7–16)
AST SERPL-CCNC: 40 U/L (ref 15–37)
BILIRUB DIRECT SERPL-MCNC: 0.1 MG/DL
BILIRUB SERPL-MCNC: 0.4 MG/DL (ref 0.2–1)
BUN SERPL-MCNC: 18 MG/DL (ref 7–18)
CALCIUM SERPL-MCNC: 7.8 MG/DL (ref 8.5–10.1)
CHLORIDE SERPL-SCNC: 104 MMOL/L (ref 98–107)
CO2 SERPL-SCNC: 24 MMOL/L (ref 21–32)
CREAT SERPL-MCNC: 0.7 MG/DL (ref 0.7–1.3)
ERYTHROCYTE [DISTWIDTH] IN BLOOD BY AUTOMATED COUNT: 14.9 % (ref 10.5–14.5)
GLUCOSE SERPL-MCNC: 93 MG/DL (ref 74–106)
HCT VFR BLD CALC: 41.5 % (ref 42–52)
HGB BLD-MCNC: 13.5 GM/DL (ref 14–18)
MCH RBC QN AUTO: 27 PG (ref 26–34)
MCHC RBC AUTO-ENTMCNC: 32.6 G/DL (ref 28–37)
MCV RBC: 82.7 FL (ref 80–100)
PHOSPHATE SERPL-MCNC: 2.4 MG/DL (ref 2.5–4.9)
PLATELET # BLD: 268 THOU/UL (ref 150–400)
POTASSIUM SERPL-SCNC: 4.2 MMOL/L (ref 3.5–5.1)
PROT SERPL-MCNC: 6.4 G/DL (ref 6.4–8.2)
RBC # BLD AUTO: 5.02 MIL/UL (ref 4.5–6)
SODIUM SERPL-SCNC: 138 MMOL/L (ref 136–145)
WBC # BLD AUTO: 7.8 THOU/UL (ref 4–11)

## 2021-08-21 NOTE — NUR
PATIENT ALERT/ORIENTED. VSS THROUGH OUT THIS SHIFT. DENIES ANY PAIN,
COMFORTABLE WITH PCA PUMP INFUSING. RIGHT LATERAL CT TUBE TO WATERSEAL PER DR. RIVERA. RIGHT RADIAL RAMONA DISCONTINED. LYNCH DISCONTINED. USING URINAL AT
BEDSIDE. UP TO CHAIR FOR LUNCH WITH MINIMAL ASSIST. PATIENT WITH VERY POOR
APPETITE AND POOR MOTIVATION. REMAINS ON 15L HIGH FLOW NC. REMAINS IN
ISOLATION FOR COVID PNA.

## 2021-08-21 NOTE — NUR
ASSUMED CARE AT 1900. PCA INFUSING. PT SLEPT POORLY. TRIED TO TITRATE  O2 BUT
DESATTED TO UPPER 80'S, INCREASED BACK TO 15L. 275 ML OUT OF CHEST TUBE. SLOW
PROGRESSION OF GOALS.

## 2021-08-22 VITALS — DIASTOLIC BLOOD PRESSURE: 61 MMHG | SYSTOLIC BLOOD PRESSURE: 89 MMHG

## 2021-08-22 VITALS — SYSTOLIC BLOOD PRESSURE: 130 MMHG | DIASTOLIC BLOOD PRESSURE: 92 MMHG

## 2021-08-22 VITALS — SYSTOLIC BLOOD PRESSURE: 108 MMHG | DIASTOLIC BLOOD PRESSURE: 70 MMHG

## 2021-08-22 VITALS — DIASTOLIC BLOOD PRESSURE: 78 MMHG | SYSTOLIC BLOOD PRESSURE: 117 MMHG

## 2021-08-22 VITALS — SYSTOLIC BLOOD PRESSURE: 111 MMHG | DIASTOLIC BLOOD PRESSURE: 71 MMHG

## 2021-08-22 VITALS — SYSTOLIC BLOOD PRESSURE: 127 MMHG | DIASTOLIC BLOOD PRESSURE: 88 MMHG

## 2021-08-22 VITALS — DIASTOLIC BLOOD PRESSURE: 64 MMHG | SYSTOLIC BLOOD PRESSURE: 106 MMHG

## 2021-08-22 VITALS — DIASTOLIC BLOOD PRESSURE: 66 MMHG | SYSTOLIC BLOOD PRESSURE: 110 MMHG

## 2021-08-22 VITALS — DIASTOLIC BLOOD PRESSURE: 61 MMHG | SYSTOLIC BLOOD PRESSURE: 108 MMHG

## 2021-08-22 VITALS — DIASTOLIC BLOOD PRESSURE: 72 MMHG | SYSTOLIC BLOOD PRESSURE: 123 MMHG

## 2021-08-22 VITALS — SYSTOLIC BLOOD PRESSURE: 104 MMHG | DIASTOLIC BLOOD PRESSURE: 66 MMHG

## 2021-08-22 VITALS — SYSTOLIC BLOOD PRESSURE: 113 MMHG | DIASTOLIC BLOOD PRESSURE: 74 MMHG

## 2021-08-22 VITALS — SYSTOLIC BLOOD PRESSURE: 101 MMHG | DIASTOLIC BLOOD PRESSURE: 71 MMHG

## 2021-08-22 VITALS — SYSTOLIC BLOOD PRESSURE: 116 MMHG | DIASTOLIC BLOOD PRESSURE: 75 MMHG

## 2021-08-22 VITALS — SYSTOLIC BLOOD PRESSURE: 103 MMHG | DIASTOLIC BLOOD PRESSURE: 73 MMHG

## 2021-08-22 VITALS — SYSTOLIC BLOOD PRESSURE: 124 MMHG | DIASTOLIC BLOOD PRESSURE: 73 MMHG

## 2021-08-22 VITALS — DIASTOLIC BLOOD PRESSURE: 68 MMHG | SYSTOLIC BLOOD PRESSURE: 105 MMHG

## 2021-08-22 VITALS — SYSTOLIC BLOOD PRESSURE: 107 MMHG | DIASTOLIC BLOOD PRESSURE: 69 MMHG

## 2021-08-22 VITALS — SYSTOLIC BLOOD PRESSURE: 105 MMHG | DIASTOLIC BLOOD PRESSURE: 62 MMHG

## 2021-08-22 VITALS — DIASTOLIC BLOOD PRESSURE: 74 MMHG | SYSTOLIC BLOOD PRESSURE: 111 MMHG

## 2021-08-22 VITALS — DIASTOLIC BLOOD PRESSURE: 66 MMHG | SYSTOLIC BLOOD PRESSURE: 114 MMHG

## 2021-08-22 VITALS — DIASTOLIC BLOOD PRESSURE: 67 MMHG | SYSTOLIC BLOOD PRESSURE: 105 MMHG

## 2021-08-22 VITALS — SYSTOLIC BLOOD PRESSURE: 97 MMHG | DIASTOLIC BLOOD PRESSURE: 65 MMHG

## 2021-08-22 VITALS — DIASTOLIC BLOOD PRESSURE: 76 MMHG | SYSTOLIC BLOOD PRESSURE: 105 MMHG

## 2021-08-22 VITALS — SYSTOLIC BLOOD PRESSURE: 108 MMHG | DIASTOLIC BLOOD PRESSURE: 61 MMHG

## 2021-08-22 VITALS — DIASTOLIC BLOOD PRESSURE: 73 MMHG | SYSTOLIC BLOOD PRESSURE: 114 MMHG

## 2021-08-22 VITALS — SYSTOLIC BLOOD PRESSURE: 111 MMHG | DIASTOLIC BLOOD PRESSURE: 68 MMHG

## 2021-08-22 VITALS — SYSTOLIC BLOOD PRESSURE: 107 MMHG | DIASTOLIC BLOOD PRESSURE: 77 MMHG

## 2021-08-22 VITALS — DIASTOLIC BLOOD PRESSURE: 70 MMHG | SYSTOLIC BLOOD PRESSURE: 116 MMHG

## 2021-08-22 VITALS — DIASTOLIC BLOOD PRESSURE: 74 MMHG | SYSTOLIC BLOOD PRESSURE: 109 MMHG

## 2021-08-22 VITALS — SYSTOLIC BLOOD PRESSURE: 105 MMHG | DIASTOLIC BLOOD PRESSURE: 65 MMHG

## 2021-08-22 LAB
ALBUMIN SERPL-MCNC: 2.1 G/DL (ref 3.4–5)
ALT SERPL-CCNC: 36 U/L (ref 16–63)
ANION GAP SERPL CALC-SCNC: 8 MMOL/L (ref 7–16)
AST SERPL-CCNC: 39 U/L (ref 15–37)
BILIRUB DIRECT SERPL-MCNC: 0.1 MG/DL
BILIRUB SERPL-MCNC: 0.5 MG/DL (ref 0.2–1)
BUN SERPL-MCNC: 18 MG/DL (ref 7–18)
CALCIUM SERPL-MCNC: 8.4 MG/DL (ref 8.5–10.1)
CHLORIDE SERPL-SCNC: 102 MMOL/L (ref 98–107)
CO2 SERPL-SCNC: 25 MMOL/L (ref 21–32)
CREAT SERPL-MCNC: 0.7 MG/DL (ref 0.7–1.3)
ERYTHROCYTE [DISTWIDTH] IN BLOOD BY AUTOMATED COUNT: 14.9 % (ref 10.5–14.5)
GLUCOSE SERPL-MCNC: 94 MG/DL (ref 74–106)
HCT VFR BLD CALC: 43 % (ref 42–52)
HGB BLD-MCNC: 14 GM/DL (ref 14–18)
MCH RBC QN AUTO: 26.6 PG (ref 26–34)
MCHC RBC AUTO-ENTMCNC: 32.6 G/DL (ref 28–37)
MCV RBC: 81.7 FL (ref 80–100)
PHOSPHATE SERPL-MCNC: 2.6 MG/DL (ref 2.6–4.7)
PLATELET # BLD: 260 THOU/UL (ref 150–400)
POTASSIUM SERPL-SCNC: 4 MMOL/L (ref 3.5–5.1)
PROT SERPL-MCNC: 6.2 G/DL (ref 6.4–8.2)
RBC # BLD AUTO: 5.26 MIL/UL (ref 4.5–6)
SODIUM SERPL-SCNC: 135 MMOL/L (ref 136–145)
WBC # BLD AUTO: 8.7 THOU/UL (ref 4–11)

## 2021-08-22 NOTE — NUR
Upon initial assessment pt desatting into upper 80s, 50% ventimask added to
15L HFC w/ improved sats.  R lateral chest tube noted to have intermittent air
leak, + tidaling, no sub q air at site.  Pt rested well through remainder of
night until around 0400.  Lung sounds on right absent, O2 sats down into mid
80s with increased work of breathing noted.  100% NRB applied with minimal
improvement.  CT placed back to -20 sxn, however pt began coughing and
desatting into low 80s.  Placed back to water seal.  Radiology called at 0417
and 0435 for stat CXR, at 0500 bedside CXR showed a complete pneumo.  Dr. Wilkes phoned and updated, orders received to place CT to -10 cm sxn.  Pt
tolerated much better and is now resting well on 15L HFC % NRB with O2
sat of 97%.  Will attempt to wean O2.  Pain has been well controlled with PCA.
Pt voiding per urinal without difficulty.  COVID precautions maintained.  No
family called this shift.  Not progressing towards goals at this time.  Will
continue to monitor.

## 2021-08-23 VITALS — SYSTOLIC BLOOD PRESSURE: 105 MMHG | DIASTOLIC BLOOD PRESSURE: 64 MMHG

## 2021-08-23 VITALS — DIASTOLIC BLOOD PRESSURE: 64 MMHG | SYSTOLIC BLOOD PRESSURE: 99 MMHG

## 2021-08-23 VITALS — DIASTOLIC BLOOD PRESSURE: 68 MMHG | SYSTOLIC BLOOD PRESSURE: 110 MMHG

## 2021-08-23 VITALS — SYSTOLIC BLOOD PRESSURE: 124 MMHG | DIASTOLIC BLOOD PRESSURE: 77 MMHG

## 2021-08-23 VITALS — DIASTOLIC BLOOD PRESSURE: 72 MMHG | SYSTOLIC BLOOD PRESSURE: 124 MMHG

## 2021-08-23 VITALS — DIASTOLIC BLOOD PRESSURE: 68 MMHG | SYSTOLIC BLOOD PRESSURE: 100 MMHG

## 2021-08-23 VITALS — SYSTOLIC BLOOD PRESSURE: 97 MMHG | DIASTOLIC BLOOD PRESSURE: 62 MMHG

## 2021-08-23 VITALS — SYSTOLIC BLOOD PRESSURE: 103 MMHG | DIASTOLIC BLOOD PRESSURE: 71 MMHG

## 2021-08-23 VITALS — DIASTOLIC BLOOD PRESSURE: 69 MMHG | SYSTOLIC BLOOD PRESSURE: 99 MMHG

## 2021-08-23 VITALS — DIASTOLIC BLOOD PRESSURE: 75 MMHG | SYSTOLIC BLOOD PRESSURE: 111 MMHG

## 2021-08-23 VITALS — SYSTOLIC BLOOD PRESSURE: 101 MMHG | DIASTOLIC BLOOD PRESSURE: 64 MMHG

## 2021-08-23 VITALS — SYSTOLIC BLOOD PRESSURE: 92 MMHG | DIASTOLIC BLOOD PRESSURE: 58 MMHG

## 2021-08-23 VITALS — SYSTOLIC BLOOD PRESSURE: 98 MMHG | DIASTOLIC BLOOD PRESSURE: 66 MMHG

## 2021-08-23 VITALS — DIASTOLIC BLOOD PRESSURE: 67 MMHG | SYSTOLIC BLOOD PRESSURE: 106 MMHG

## 2021-08-23 VITALS — SYSTOLIC BLOOD PRESSURE: 102 MMHG | DIASTOLIC BLOOD PRESSURE: 66 MMHG

## 2021-08-23 VITALS — SYSTOLIC BLOOD PRESSURE: 104 MMHG | DIASTOLIC BLOOD PRESSURE: 70 MMHG

## 2021-08-23 VITALS — DIASTOLIC BLOOD PRESSURE: 69 MMHG | SYSTOLIC BLOOD PRESSURE: 109 MMHG

## 2021-08-23 VITALS — SYSTOLIC BLOOD PRESSURE: 101 MMHG | DIASTOLIC BLOOD PRESSURE: 61 MMHG

## 2021-08-23 VITALS — DIASTOLIC BLOOD PRESSURE: 66 MMHG | SYSTOLIC BLOOD PRESSURE: 99 MMHG

## 2021-08-23 VITALS — SYSTOLIC BLOOD PRESSURE: 114 MMHG | DIASTOLIC BLOOD PRESSURE: 72 MMHG

## 2021-08-23 VITALS — SYSTOLIC BLOOD PRESSURE: 104 MMHG | DIASTOLIC BLOOD PRESSURE: 63 MMHG

## 2021-08-23 VITALS — SYSTOLIC BLOOD PRESSURE: 99 MMHG | DIASTOLIC BLOOD PRESSURE: 63 MMHG

## 2021-08-23 VITALS — DIASTOLIC BLOOD PRESSURE: 69 MMHG | SYSTOLIC BLOOD PRESSURE: 116 MMHG

## 2021-08-23 LAB
ALBUMIN SERPL-MCNC: 2.1 G/DL (ref 3.4–5)
ALT SERPL-CCNC: 34 U/L (ref 30–65)
ANION GAP SERPL CALC-SCNC: 10 MMOL/L (ref 7–16)
AST SERPL-CCNC: 32 U/L (ref 15–37)
BILIRUB DIRECT SERPL-MCNC: 0.2 MG/DL
BILIRUB SERPL-MCNC: 0.4 MG/DL (ref 0.2–1)
BUN SERPL-MCNC: 20 MG/DL (ref 7–18)
CALCIUM SERPL-MCNC: 8.1 MG/DL (ref 8.5–10.1)
CHLORIDE SERPL-SCNC: 99 MMOL/L (ref 98–107)
CO2 SERPL-SCNC: 26 MMOL/L (ref 21–32)
CREAT SERPL-MCNC: 0.7 MG/DL (ref 0.7–1.3)
ERYTHROCYTE [DISTWIDTH] IN BLOOD BY AUTOMATED COUNT: 14.6 % (ref 10.5–14.5)
GLUCOSE SERPL-MCNC: 96 MG/DL (ref 74–106)
HCT VFR BLD CALC: 41.4 % (ref 42–52)
HGB BLD-MCNC: 13.6 GM/DL (ref 14–18)
MCH RBC QN AUTO: 27.2 PG (ref 26–34)
MCHC RBC AUTO-ENTMCNC: 32.9 G/DL (ref 28–37)
MCV RBC: 82.7 FL (ref 80–100)
PHOSPHATE SERPL-MCNC: 2.8 MG/DL (ref 2.5–4.9)
PLATELET # BLD: 227 THOU/UL (ref 150–400)
POTASSIUM SERPL-SCNC: 4.5 MMOL/L (ref 3.5–5.1)
PROT SERPL-MCNC: 5.8 G/DL (ref 6.4–8.2)
RBC # BLD AUTO: 5.01 MIL/UL (ref 4.5–6)
SODIUM SERPL-SCNC: 135 MMOL/L (ref 136–145)
WBC # BLD AUTO: 9.2 THOU/UL (ref 4–11)

## 2021-08-23 NOTE — NUR
From [facility]. COVID +. Isolation. NRB mask. Chest tube. Cm
spoke with sig other Nan via phone call. Intro to transition of care. He
was incarcerated in care home, then went to [facility] for 3-4 months. We live
in apartment with lots of stair. there are stairs up to apartment, then go
inside and stairs 24 + up to 3rd floor. Independent. Was working of [employer]
for restaurant. No DME, no dr or insurance because he was in care home.  All in
home got covid and some out quarantine now. We did not have the vaccine but
going to get per Nan. Will cont. following as needed for dc needs.

## 2021-08-23 NOTE — O
[facility]
[address]
[place]   [address]                     OPERATIVE REPORT              
_______________________________________________________________________________
 
Name:       MINDY GILL SR              Room #:         238-P       [place]  
M.R.#:      [id number]                       Account #:      [id number]  
Admission:  08/19/21    Attend Phys:    Forrest Guerrero MD    
Discharge:              Date of Birth:  [birth year]  
                                                          Report #: [id number]
                                                                    [id number] 
_______________________________________________________________________________
THIS REPORT FOR:  
 
cc:  FAM - Family physician unknown
     FAM - Family physician unknown                                       
     Ortiz Wilkes MD                                            ~
 
DATE OF SERVICE: 08/20/2021
 
PREOPERATIVE DIAGNOSIS:  Spontaneous pneumothorax with large persistent air leak
in the setting of COVID pneumonia.
 
POSTOPERATIVE DIAGNOSIS:  Spontaneous pneumothorax with large persistent air 
leak in the setting of COVID pneumonia.
 
OPERATION:
Bronchoscopy, right video-assisted thoracoscopy, wedge resection of blebs, right
thoracotomy with decortication of upper lobe, chemical pleurodesis and creation 
of a pleural tent.
 
SURGEON:  Ortiz Wilkes MD.
 
ASSISTANT:  NAV Arshad (Jeremy).
 
ANESTHESIA:  General.
 
INDICATIONS:  The patient is a 56-year-old transferred from [facility] with a large persistent air leak.  The setting of this is spontaneous 
pneumothorax sustained along with a case of COVID pneumonia.  Chest x-ray showed
mediastinal shift despite placement of chest tube and the patient had shortness 
of breath and a large air leak with no obvious technical explanation.
 
FINDINGS AND TECHNIQUE:  After general anesthesia was established, observing 
COVID protocols, flexible diagnostic bronchoscopy was performed.  No 
endobronchial lesions were noted.
 
A double lumen endotracheal tube was placed and its position ascertained with 
bronchoscopic guidance.
 
The patient was positioned with right side up.  Exposure was obtained through 
typical video-assisted thoracoscopy ports.  There were intense adhesions between
upper lobe, visceral pleura and parietal pleura.  It was not possible to 
distinguish where mediastinal pleura began and lung end and because of this 
confusing anatomy related to old scarring, I made a decision to extend the 
incision to a thoracotomy.
 
Once better exposure was obtained, we were able to identify the adhesions 
 
 
 
[facility]
[address]
[place]   [address]                     OPERATIVE REPORT              
_______________________________________________________________________________
 
Name:       IMNDY GILL SR              Room #:         238-P       [facility] [place]  
..#:      [id number]                       Account #:      [id number]  
Admission:  08/19/21    Attend Phys:    Forrest Guerrero MD    
Discharge:              Date of Birth:  [birth year]  
                                                          Report #: [id number]
                                                                    [id number] 
_______________________________________________________________________________
 
between the upper lobe and mediastinal pleura.  All of these adhesions were 
taken down.  Even with this, the upper lobe did not reexpand.  There appeared to
be an envelope of scar covering and constraining the upper lobe and I did my 
best to decorticate all of this to help the upper lobe reexpand.  Having done 
this blebs were identified in the upper lobe and these were resected.
 
Fluid was instilled into the chest and air leaks were identified and we 
attempted to correct any air leak identified.
 
Once we were satisfied with the decortication and the resection, chest tube was 
brought through the lowest port and doxycycline solution was instilled for 
chemical pleurodesis.
 
I also dissected parietal pleura from chest wall and took this down in the form 
of a pleural tent to cover the upper lobe.  The temperature was tacked to the 
lower lobe laterally and the chest tube was placed apically, so that the 
parietal pleura would cover the globe and that there might be some residual 
space between the parietal pleura and chest wall that would close in time.
 
Hemostasis was ascertained in all areas.
 
It should be mentioned that the thoracotomy was made in a rib sparing and nerve 
sparing approach and when we closed the thoracotomy this was continued to avoid 
any pressure on the intercostal nerve.
 
The chest was closed in the usual fashion.  The patient was taken back to the 
intensive care unit in good condition having tolerated the procedure well 
observing COVID protocols after surgery.
 
 
 
 
 
 
 
 
 
 
 
 
 
 
 
  <ELECTRONICALLY SIGNED>
   By: Ortiz Wilkes MD            
  08/23/21     1555
D: 08/21/21 1013                           _____________________________________
T: 08/21/21 1033                           Ortiz Wilkes MD              /nt

## 2021-08-23 NOTE — NUR
PATIENT ALERT/ORIENTED. FENTANYL PCA PUMP DISCONTINUED. PATIENT DENIES ANY
PAIN OR DISCOMFORT. RIGHT LATERAL CHEST TUBE TO -20 SUCTION, AIR LEAK PRESENT.
TITRATED UP TO 15 L NC TO MAINTAIN SATS. PATIENT DESATS WITH ACTIVITY AND
SLOWLY RECOVERS. DR. RIVERA AWARE. STARTED ON APPITITE STIMULANT.

## 2021-08-23 NOTE — NUR
PT HAS RESTED QUIETLY ALL NIGHT. DENIES PAIN NOR DISCOMFORT. LUNGS DIMINISHED
O2 SAT 95 % ON 13 L HF NC.  AFEBRILE.  RIGHT LATERAL  CC SEROSANG DRG
VOIDED 1000 CC AISSATOU URINE.   FENTANYL PCA 15 MCG BASAL RATE. A VERY
DELIGHTFUL GENTLEMAN. PROGRESSING TOWARD GOALS. WILL CONT TO MONITOR

## 2021-08-23 NOTE — HC
[facility]
[address]
[place]   [address]                     CONSULTATION                  
_______________________________________________________________________________
 
Name:       MINDY GILL SR              Room #:         238-P       [facility] [place]  
M.R.#:      [id number]                       Account #:      [id number]  
Admission:  08/19/21    Attend Phys:    Forrest Guerrero MD    
Discharge:              Date of Birth:  [birth year]  
                                                          Report #: [id number]
                                                                    [id number] 
_______________________________________________________________________________
THIS REPORT FOR:  
 
cc:  FAM - Family physician unknown
     FAM - Family physician unknown                                       
     Ortiz Wilkes MD                                            ~
 
DATE OF SERVICE: 08/19/2021
 
HISTORY OF PRESENT ILLNESS:  We were asked to see the patient.  The patient is a
56-year-old transferred from [facility] Emergency Department.  
The patient presented there with worsening shortness of breath over the last 2-3
days.  This was associated with a productive cough.  The patient's wife at home 
was positive for COVID-19 a week ago.
 
On admission around presentation to the Emergency Department, the patient had a 
large right-sided pneumothorax with mediastinal shift.  Chest tube was placed, 
but it was never possible to completely ameliorate the pneumothorax.  Chest tube
was repositioned and chest CT scan was done.
 
The patient was transferred to this facility (ultimately) for more definitive 
treatment of this air leak problem.
 
At [place], the patient received Zosyn, Decadron and IV fluids and management
of his COVID pneumonia.
 
PAST MEDICAL HISTORY:  The patient denies such chronic problems as hypertension 
and diabetes.
 
MEDICATIONS:  Include Zosyn, acetaminophen, hydrocodone, dexamethasone, 
methylprednisolone.
 
ALLERGIES:  None known.
 
REVIEW OF SYSTEMS:
GENERAL:  Denies previous weight change before this current illness.
EYES:  No double vision, left eye.  No change in vision in the right eye.
HENT:  No headache, sinus problems, hearing problems.
RESPIRATORY:  Admits to productive cough.
CARDIAC:  Denies angina.  Denies palpitations.
GASTROINTESTINAL:  Denies nausea, vomiting, diarrhea, pain, blood.
GENITOURINARY:  Denies urgency, frequency, blood.
NEUROLOGIC:  Denies motor or sensory problems.
SKIN:  Denies rash or infection.
ENDOCRINE:  Denies goiter or tremor.
MUSCULOSKELETAL:  Denies bone or joint pain.
 
 
 
 
[facility]
[address] [place]   [address]                     CONSULTATION                  
_______________________________________________________________________________
 
Name:       MINDY GILL SR              Room #:         238-P       [facility] [place]  
M.R.#:      [id number]                       Account #:      [id number]  
Admission:  08/19/21    Attend Phys:    Forrest Guerrero MD    
Discharge:              Date of Birth:  [birth year]  
                                                          Report #: [id number]
                                                                    [id number] 
_______________________________________________________________________________
 
PHYSICAL EXAMINATION:
GENERAL:  The patient is in bed with a nonrebreather mask _____.
EYES:  Traumatic enucleation, left eye.  Right eye, no icterus.  No arcus.
NECK:  No mass.  No bruit.
CHEST:  Decreased breath sounds bilaterally.
HEART:  Rhythm regular.
ABDOMEN:  Soft, scaphoid.
EXTREMITIES:  No clubbing, cyanosis or edema.
SKIN:  No rash or infection.
NEUROLOGIC:  No motor or sensory loss.
PSYCHIATRIC:  Oriented x3, appropriate.
MUSCULOSKELETAL:  No bone or joint deformity or asymmetry.
 
The chest tube was inspected.  Chest tube appears to be in good position with an
active air leak.
 
I have reviewed the radiographic findings with the patient and discussed them in
the context of his presentation.  I have recommended surgery to include 
video-assisted thoracoscopy for resection of blebs and/or identification of the 
air leak.  Risks and details of this, options and alternatives, were reviewed.  
Risks include but are not limited to bleeding, infection, anesthesia risks, 
heart and lung problems and recurrence of the problem. The chance that we would 
need to make a thoracotomy for definitive treatment was also discussed.  The 
patient understands all of this and agrees with this approach.  We plan to 
surgery for tomorrow.
 
Thank you for the consult.
 
 
 
 
 
 
 
 
 
 
 
 
 
 
 
 
  <ELECTRONICALLY SIGNED>
   By: Ortiz Wilkes MD            
  08/23/21     1555
D: 08/19/21 1345                           _____________________________________
T: 08/19/21 1428                           Ortiz Wilkes MD              /nt

## 2021-08-24 VITALS — DIASTOLIC BLOOD PRESSURE: 55 MMHG | SYSTOLIC BLOOD PRESSURE: 82 MMHG

## 2021-08-24 VITALS — SYSTOLIC BLOOD PRESSURE: 114 MMHG | DIASTOLIC BLOOD PRESSURE: 75 MMHG

## 2021-08-24 VITALS — SYSTOLIC BLOOD PRESSURE: 102 MMHG | DIASTOLIC BLOOD PRESSURE: 65 MMHG

## 2021-08-24 VITALS — SYSTOLIC BLOOD PRESSURE: 90 MMHG | DIASTOLIC BLOOD PRESSURE: 60 MMHG

## 2021-08-24 VITALS — DIASTOLIC BLOOD PRESSURE: 72 MMHG | SYSTOLIC BLOOD PRESSURE: 100 MMHG

## 2021-08-24 VITALS — DIASTOLIC BLOOD PRESSURE: 59 MMHG | SYSTOLIC BLOOD PRESSURE: 89 MMHG

## 2021-08-24 VITALS — DIASTOLIC BLOOD PRESSURE: 62 MMHG | SYSTOLIC BLOOD PRESSURE: 91 MMHG

## 2021-08-24 VITALS — DIASTOLIC BLOOD PRESSURE: 74 MMHG | SYSTOLIC BLOOD PRESSURE: 100 MMHG

## 2021-08-24 VITALS — DIASTOLIC BLOOD PRESSURE: 50 MMHG | SYSTOLIC BLOOD PRESSURE: 95 MMHG

## 2021-08-24 VITALS — DIASTOLIC BLOOD PRESSURE: 56 MMHG | SYSTOLIC BLOOD PRESSURE: 89 MMHG

## 2021-08-24 VITALS — DIASTOLIC BLOOD PRESSURE: 68 MMHG | SYSTOLIC BLOOD PRESSURE: 104 MMHG

## 2021-08-24 VITALS — SYSTOLIC BLOOD PRESSURE: 92 MMHG | DIASTOLIC BLOOD PRESSURE: 56 MMHG

## 2021-08-24 VITALS — DIASTOLIC BLOOD PRESSURE: 57 MMHG | SYSTOLIC BLOOD PRESSURE: 92 MMHG

## 2021-08-24 VITALS — DIASTOLIC BLOOD PRESSURE: 57 MMHG | SYSTOLIC BLOOD PRESSURE: 90 MMHG

## 2021-08-24 VITALS — SYSTOLIC BLOOD PRESSURE: 100 MMHG | DIASTOLIC BLOOD PRESSURE: 58 MMHG

## 2021-08-24 VITALS — DIASTOLIC BLOOD PRESSURE: 56 MMHG | SYSTOLIC BLOOD PRESSURE: 86 MMHG

## 2021-08-24 VITALS — DIASTOLIC BLOOD PRESSURE: 64 MMHG | SYSTOLIC BLOOD PRESSURE: 96 MMHG

## 2021-08-24 VITALS — DIASTOLIC BLOOD PRESSURE: 66 MMHG | SYSTOLIC BLOOD PRESSURE: 100 MMHG

## 2021-08-24 VITALS — DIASTOLIC BLOOD PRESSURE: 68 MMHG | SYSTOLIC BLOOD PRESSURE: 100 MMHG

## 2021-08-24 VITALS — SYSTOLIC BLOOD PRESSURE: 113 MMHG | DIASTOLIC BLOOD PRESSURE: 72 MMHG

## 2021-08-24 VITALS — DIASTOLIC BLOOD PRESSURE: 67 MMHG | SYSTOLIC BLOOD PRESSURE: 95 MMHG

## 2021-08-24 VITALS — SYSTOLIC BLOOD PRESSURE: 91 MMHG | DIASTOLIC BLOOD PRESSURE: 60 MMHG

## 2021-08-24 VITALS — DIASTOLIC BLOOD PRESSURE: 60 MMHG | SYSTOLIC BLOOD PRESSURE: 97 MMHG

## 2021-08-24 VITALS — SYSTOLIC BLOOD PRESSURE: 90 MMHG | DIASTOLIC BLOOD PRESSURE: 54 MMHG

## 2021-08-24 LAB
ALBUMIN SERPL-MCNC: 2.3 G/DL (ref 3.4–5)
ALT SERPL-CCNC: 43 U/L (ref 16–63)
ANION GAP SERPL CALC-SCNC: 14 MMOL/L (ref 7–16)
AST SERPL-CCNC: 33 U/L (ref 15–37)
BILIRUB DIRECT SERPL-MCNC: < 0.1 MG/DL
BILIRUB SERPL-MCNC: 0.5 MG/DL (ref 0.2–1)
BUN SERPL-MCNC: 15 MG/DL (ref 7–18)
CALCIUM SERPL-MCNC: 8.7 MG/DL (ref 8.5–10.1)
CHLORIDE SERPL-SCNC: 100 MMOL/L (ref 98–107)
CO2 SERPL-SCNC: 26 MMOL/L (ref 21–32)
CREAT SERPL-MCNC: 0.7 MG/DL (ref 0.7–1.3)
GLUCOSE SERPL-MCNC: 108 MG/DL (ref 74–106)
PHOSPHATE SERPL-MCNC: 2.4 MG/DL (ref 2.6–4.7)
POTASSIUM SERPL-SCNC: 4.1 MMOL/L (ref 3.5–5.1)
PROT SERPL-MCNC: 6.3 G/DL (ref 6.4–8.2)
SODIUM SERPL-SCNC: 140 MMOL/L (ref 136–145)

## 2021-08-25 VITALS — DIASTOLIC BLOOD PRESSURE: 56 MMHG | SYSTOLIC BLOOD PRESSURE: 89 MMHG

## 2021-08-25 VITALS — DIASTOLIC BLOOD PRESSURE: 54 MMHG | SYSTOLIC BLOOD PRESSURE: 86 MMHG

## 2021-08-25 VITALS — DIASTOLIC BLOOD PRESSURE: 56 MMHG | SYSTOLIC BLOOD PRESSURE: 87 MMHG

## 2021-08-25 VITALS — SYSTOLIC BLOOD PRESSURE: 82 MMHG | DIASTOLIC BLOOD PRESSURE: 54 MMHG

## 2021-08-25 VITALS — SYSTOLIC BLOOD PRESSURE: 92 MMHG | DIASTOLIC BLOOD PRESSURE: 58 MMHG

## 2021-08-25 VITALS — SYSTOLIC BLOOD PRESSURE: 85 MMHG | DIASTOLIC BLOOD PRESSURE: 54 MMHG

## 2021-08-25 VITALS — SYSTOLIC BLOOD PRESSURE: 94 MMHG | DIASTOLIC BLOOD PRESSURE: 68 MMHG

## 2021-08-25 VITALS — SYSTOLIC BLOOD PRESSURE: 85 MMHG | DIASTOLIC BLOOD PRESSURE: 49 MMHG

## 2021-08-25 VITALS — SYSTOLIC BLOOD PRESSURE: 91 MMHG | DIASTOLIC BLOOD PRESSURE: 53 MMHG

## 2021-08-25 VITALS — DIASTOLIC BLOOD PRESSURE: 58 MMHG | SYSTOLIC BLOOD PRESSURE: 89 MMHG

## 2021-08-25 VITALS — SYSTOLIC BLOOD PRESSURE: 87 MMHG | DIASTOLIC BLOOD PRESSURE: 55 MMHG

## 2021-08-25 VITALS — SYSTOLIC BLOOD PRESSURE: 94 MMHG | DIASTOLIC BLOOD PRESSURE: 62 MMHG

## 2021-08-25 VITALS — SYSTOLIC BLOOD PRESSURE: 93 MMHG | DIASTOLIC BLOOD PRESSURE: 62 MMHG

## 2021-08-25 VITALS — DIASTOLIC BLOOD PRESSURE: 59 MMHG | SYSTOLIC BLOOD PRESSURE: 85 MMHG

## 2021-08-25 VITALS — SYSTOLIC BLOOD PRESSURE: 89 MMHG | DIASTOLIC BLOOD PRESSURE: 56 MMHG

## 2021-08-25 VITALS — DIASTOLIC BLOOD PRESSURE: 64 MMHG | SYSTOLIC BLOOD PRESSURE: 96 MMHG

## 2021-08-25 VITALS — DIASTOLIC BLOOD PRESSURE: 62 MMHG | SYSTOLIC BLOOD PRESSURE: 93 MMHG

## 2021-08-25 VITALS — SYSTOLIC BLOOD PRESSURE: 80 MMHG | DIASTOLIC BLOOD PRESSURE: 51 MMHG

## 2021-08-25 VITALS — DIASTOLIC BLOOD PRESSURE: 56 MMHG | SYSTOLIC BLOOD PRESSURE: 92 MMHG

## 2021-08-25 VITALS — SYSTOLIC BLOOD PRESSURE: 77 MMHG | DIASTOLIC BLOOD PRESSURE: 51 MMHG

## 2021-08-25 VITALS — SYSTOLIC BLOOD PRESSURE: 85 MMHG | DIASTOLIC BLOOD PRESSURE: 51 MMHG

## 2021-08-25 VITALS — DIASTOLIC BLOOD PRESSURE: 52 MMHG | SYSTOLIC BLOOD PRESSURE: 87 MMHG

## 2021-08-25 VITALS — SYSTOLIC BLOOD PRESSURE: 92 MMHG | DIASTOLIC BLOOD PRESSURE: 66 MMHG

## 2021-08-25 VITALS — SYSTOLIC BLOOD PRESSURE: 99 MMHG | DIASTOLIC BLOOD PRESSURE: 65 MMHG

## 2021-08-25 VITALS — DIASTOLIC BLOOD PRESSURE: 69 MMHG | SYSTOLIC BLOOD PRESSURE: 90 MMHG

## 2021-08-25 LAB
ALBUMIN SERPL-MCNC: 2.5 G/DL (ref 3.4–5)
ALT SERPL-CCNC: 69 U/L (ref 16–63)
ANION GAP SERPL CALC-SCNC: 8 MMOL/L (ref 7–16)
AST SERPL-CCNC: 37 U/L (ref 15–37)
BASOPHILS NFR BLD AUTO: 0.1 % (ref 0–2)
BE(VIVO): 3 MMOL/L
BILIRUB SERPL-MCNC: 0.5 MG/DL (ref 0.2–1)
BUN SERPL-MCNC: 22 MG/DL (ref 7–18)
CALCIUM SERPL-MCNC: 9 MG/DL (ref 8.5–10.1)
CHLORIDE SERPL-SCNC: 104 MMOL/L (ref 98–107)
CO2 SERPL-SCNC: 29 MMOL/L (ref 21–32)
CREAT SERPL-MCNC: 0.9 MG/DL (ref 0.7–1.3)
EOSINOPHIL NFR BLD: 0.2 % (ref 0–3)
ERYTHROCYTE [DISTWIDTH] IN BLOOD BY AUTOMATED COUNT: 14.5 % (ref 10.5–14.5)
GLUCOSE SERPL-MCNC: 116 MG/DL (ref 74–106)
GRANULOCYTES NFR BLD MANUAL: 95.5 % (ref 36–66)
HCO3 BLD-SCNC: 26.5 MMOL/L (ref 22–26)
HCT VFR BLD CALC: 44.3 % (ref 42–52)
HGB BLD-MCNC: 14.4 GM/DL (ref 14–18)
LYMPHOCYTES NFR BLD AUTO: 3.3 % (ref 24–44)
MCH RBC QN AUTO: 26.4 PG (ref 26–34)
MCHC RBC AUTO-ENTMCNC: 32.5 G/DL (ref 28–37)
MCV RBC: 81.3 FL (ref 80–100)
MONOCYTES NFR BLD: 0.9 % (ref 1–8)
NEUTROPHILS # BLD: 16.3 THOU/UL (ref 1.4–8.2)
PCO2 BLD: 37 MMHG (ref 35–45)
PLATELET # BLD: 341 THOU/UL (ref 150–400)
PO2 BLD: 94.1 MMHG (ref 80–100)
POTASSIUM SERPL-SCNC: 4.6 MMOL/L (ref 3.5–5.1)
PROT SERPL-MCNC: 6.4 G/DL (ref 6.4–8.2)
RBC # BLD AUTO: 5.45 MIL/UL (ref 4.5–6)
SODIUM SERPL-SCNC: 141 MMOL/L (ref 136–145)
WBC # BLD AUTO: 17.1 THOU/UL (ref 4–11)

## 2021-08-26 VITALS — SYSTOLIC BLOOD PRESSURE: 101 MMHG | DIASTOLIC BLOOD PRESSURE: 71 MMHG

## 2021-08-26 VITALS — DIASTOLIC BLOOD PRESSURE: 70 MMHG | SYSTOLIC BLOOD PRESSURE: 99 MMHG

## 2021-08-26 VITALS — DIASTOLIC BLOOD PRESSURE: 70 MMHG | SYSTOLIC BLOOD PRESSURE: 107 MMHG

## 2021-08-26 VITALS — DIASTOLIC BLOOD PRESSURE: 64 MMHG | SYSTOLIC BLOOD PRESSURE: 96 MMHG

## 2021-08-26 VITALS — DIASTOLIC BLOOD PRESSURE: 66 MMHG | SYSTOLIC BLOOD PRESSURE: 100 MMHG

## 2021-08-26 VITALS — SYSTOLIC BLOOD PRESSURE: 112 MMHG | DIASTOLIC BLOOD PRESSURE: 68 MMHG

## 2021-08-26 VITALS — SYSTOLIC BLOOD PRESSURE: 106 MMHG | DIASTOLIC BLOOD PRESSURE: 69 MMHG

## 2021-08-26 VITALS — DIASTOLIC BLOOD PRESSURE: 66 MMHG | SYSTOLIC BLOOD PRESSURE: 103 MMHG

## 2021-08-26 VITALS — SYSTOLIC BLOOD PRESSURE: 95 MMHG | DIASTOLIC BLOOD PRESSURE: 66 MMHG

## 2021-08-26 VITALS — SYSTOLIC BLOOD PRESSURE: 97 MMHG | DIASTOLIC BLOOD PRESSURE: 60 MMHG

## 2021-08-26 VITALS — SYSTOLIC BLOOD PRESSURE: 105 MMHG | DIASTOLIC BLOOD PRESSURE: 70 MMHG

## 2021-08-26 VITALS — DIASTOLIC BLOOD PRESSURE: 70 MMHG | SYSTOLIC BLOOD PRESSURE: 100 MMHG

## 2021-08-26 VITALS — DIASTOLIC BLOOD PRESSURE: 68 MMHG | SYSTOLIC BLOOD PRESSURE: 99 MMHG

## 2021-08-26 VITALS — DIASTOLIC BLOOD PRESSURE: 67 MMHG | SYSTOLIC BLOOD PRESSURE: 98 MMHG

## 2021-08-26 VITALS — SYSTOLIC BLOOD PRESSURE: 103 MMHG | DIASTOLIC BLOOD PRESSURE: 71 MMHG

## 2021-08-26 VITALS — DIASTOLIC BLOOD PRESSURE: 66 MMHG | SYSTOLIC BLOOD PRESSURE: 96 MMHG

## 2021-08-26 VITALS — SYSTOLIC BLOOD PRESSURE: 99 MMHG | DIASTOLIC BLOOD PRESSURE: 69 MMHG

## 2021-08-26 VITALS — DIASTOLIC BLOOD PRESSURE: 67 MMHG | SYSTOLIC BLOOD PRESSURE: 105 MMHG

## 2021-08-26 VITALS — SYSTOLIC BLOOD PRESSURE: 107 MMHG | DIASTOLIC BLOOD PRESSURE: 69 MMHG

## 2021-08-26 VITALS — SYSTOLIC BLOOD PRESSURE: 97 MMHG | DIASTOLIC BLOOD PRESSURE: 64 MMHG

## 2021-08-26 VITALS — DIASTOLIC BLOOD PRESSURE: 70 MMHG | SYSTOLIC BLOOD PRESSURE: 103 MMHG

## 2021-08-26 VITALS — DIASTOLIC BLOOD PRESSURE: 64 MMHG | SYSTOLIC BLOOD PRESSURE: 106 MMHG

## 2021-08-26 VITALS — SYSTOLIC BLOOD PRESSURE: 101 MMHG | DIASTOLIC BLOOD PRESSURE: 69 MMHG

## 2021-08-26 VITALS — SYSTOLIC BLOOD PRESSURE: 110 MMHG | DIASTOLIC BLOOD PRESSURE: 79 MMHG

## 2021-08-26 NOTE — NUR
ASSUMED CARE AT 1900. PT DENIES PAIN. HEAVY BUBBLING FROM CHEST TUBE, SOME
FAINT CRACKLES AND WHEEZES AUDIBLE TO AUSCULTATION AS WELL. INCR O2 TO 13L AS
SATS WERE SITTING AROUND 88%, NOW 90-93%. NPO AT MIDNIGHT FOR PROCEDURE. SLOW
PROGRESSION TOWARDS GOALS.

## 2021-08-27 VITALS — SYSTOLIC BLOOD PRESSURE: 99 MMHG | DIASTOLIC BLOOD PRESSURE: 60 MMHG

## 2021-08-27 VITALS — DIASTOLIC BLOOD PRESSURE: 57 MMHG | SYSTOLIC BLOOD PRESSURE: 88 MMHG

## 2021-08-27 VITALS — DIASTOLIC BLOOD PRESSURE: 68 MMHG | SYSTOLIC BLOOD PRESSURE: 99 MMHG

## 2021-08-27 VITALS — SYSTOLIC BLOOD PRESSURE: 101 MMHG | DIASTOLIC BLOOD PRESSURE: 63 MMHG

## 2021-08-27 VITALS — DIASTOLIC BLOOD PRESSURE: 56 MMHG | SYSTOLIC BLOOD PRESSURE: 96 MMHG

## 2021-08-27 VITALS — SYSTOLIC BLOOD PRESSURE: 84 MMHG | DIASTOLIC BLOOD PRESSURE: 51 MMHG

## 2021-08-27 VITALS — SYSTOLIC BLOOD PRESSURE: 103 MMHG | DIASTOLIC BLOOD PRESSURE: 69 MMHG

## 2021-08-27 VITALS — DIASTOLIC BLOOD PRESSURE: 70 MMHG | SYSTOLIC BLOOD PRESSURE: 102 MMHG

## 2021-08-27 VITALS — DIASTOLIC BLOOD PRESSURE: 64 MMHG | SYSTOLIC BLOOD PRESSURE: 89 MMHG

## 2021-08-27 VITALS — DIASTOLIC BLOOD PRESSURE: 55 MMHG | SYSTOLIC BLOOD PRESSURE: 95 MMHG

## 2021-08-27 VITALS — DIASTOLIC BLOOD PRESSURE: 68 MMHG | SYSTOLIC BLOOD PRESSURE: 103 MMHG

## 2021-08-27 VITALS — SYSTOLIC BLOOD PRESSURE: 99 MMHG | DIASTOLIC BLOOD PRESSURE: 65 MMHG

## 2021-08-27 VITALS — SYSTOLIC BLOOD PRESSURE: 101 MMHG | DIASTOLIC BLOOD PRESSURE: 64 MMHG

## 2021-08-27 VITALS — DIASTOLIC BLOOD PRESSURE: 69 MMHG | SYSTOLIC BLOOD PRESSURE: 106 MMHG

## 2021-08-27 VITALS — SYSTOLIC BLOOD PRESSURE: 101 MMHG | DIASTOLIC BLOOD PRESSURE: 61 MMHG

## 2021-08-27 VITALS — DIASTOLIC BLOOD PRESSURE: 63 MMHG | SYSTOLIC BLOOD PRESSURE: 95 MMHG

## 2021-08-27 VITALS — DIASTOLIC BLOOD PRESSURE: 62 MMHG | SYSTOLIC BLOOD PRESSURE: 98 MMHG

## 2021-08-27 VITALS — DIASTOLIC BLOOD PRESSURE: 54 MMHG | SYSTOLIC BLOOD PRESSURE: 89 MMHG

## 2021-08-27 VITALS — SYSTOLIC BLOOD PRESSURE: 96 MMHG | DIASTOLIC BLOOD PRESSURE: 61 MMHG

## 2021-08-27 VITALS — DIASTOLIC BLOOD PRESSURE: 71 MMHG | SYSTOLIC BLOOD PRESSURE: 102 MMHG

## 2021-08-27 VITALS — DIASTOLIC BLOOD PRESSURE: 64 MMHG | SYSTOLIC BLOOD PRESSURE: 105 MMHG

## 2021-08-27 VITALS — SYSTOLIC BLOOD PRESSURE: 99 MMHG | DIASTOLIC BLOOD PRESSURE: 64 MMHG

## 2021-08-27 VITALS — DIASTOLIC BLOOD PRESSURE: 55 MMHG | SYSTOLIC BLOOD PRESSURE: 92 MMHG

## 2021-08-27 VITALS — SYSTOLIC BLOOD PRESSURE: 93 MMHG | DIASTOLIC BLOOD PRESSURE: 61 MMHG

## 2021-08-27 VITALS — DIASTOLIC BLOOD PRESSURE: 57 MMHG | SYSTOLIC BLOOD PRESSURE: 87 MMHG

## 2021-08-27 VITALS — DIASTOLIC BLOOD PRESSURE: 65 MMHG | SYSTOLIC BLOOD PRESSURE: 101 MMHG

## 2021-08-27 VITALS — SYSTOLIC BLOOD PRESSURE: 88 MMHG | DIASTOLIC BLOOD PRESSURE: 56 MMHG

## 2021-08-27 VITALS — DIASTOLIC BLOOD PRESSURE: 69 MMHG | SYSTOLIC BLOOD PRESSURE: 104 MMHG

## 2021-08-27 VITALS — SYSTOLIC BLOOD PRESSURE: 104 MMHG | DIASTOLIC BLOOD PRESSURE: 60 MMHG

## 2021-08-27 VITALS — SYSTOLIC BLOOD PRESSURE: 93 MMHG | DIASTOLIC BLOOD PRESSURE: 67 MMHG

## 2021-08-27 VITALS — SYSTOLIC BLOOD PRESSURE: 84 MMHG | DIASTOLIC BLOOD PRESSURE: 58 MMHG

## 2021-08-27 VITALS — SYSTOLIC BLOOD PRESSURE: 100 MMHG | DIASTOLIC BLOOD PRESSURE: 70 MMHG

## 2021-08-27 VITALS — DIASTOLIC BLOOD PRESSURE: 63 MMHG | SYSTOLIC BLOOD PRESSURE: 106 MMHG

## 2021-08-27 VITALS — SYSTOLIC BLOOD PRESSURE: 100 MMHG | DIASTOLIC BLOOD PRESSURE: 68 MMHG

## 2021-08-27 LAB
ANION GAP SERPL CALC-SCNC: 7 MMOL/L (ref 7–16)
BUN SERPL-MCNC: 26 MG/DL (ref 7–18)
CALCIUM SERPL-MCNC: 7.8 MG/DL (ref 8.5–10.1)
CHLORIDE SERPL-SCNC: 108 MMOL/L (ref 98–107)
CO2 SERPL-SCNC: 26 MMOL/L (ref 21–32)
CREAT SERPL-MCNC: 0.9 MG/DL (ref 0.7–1.3)
ERYTHROCYTE [DISTWIDTH] IN BLOOD BY AUTOMATED COUNT: 14.8 % (ref 10.5–14.5)
GLUCOSE SERPL-MCNC: 154 MG/DL (ref 74–106)
HCT VFR BLD CALC: 40.6 % (ref 42–52)
HGB BLD-MCNC: 13.3 GM/DL (ref 14–18)
MCH RBC QN AUTO: 26.9 PG (ref 26–34)
MCHC RBC AUTO-ENTMCNC: 32.7 G/DL (ref 28–37)
MCV RBC: 82.4 FL (ref 80–100)
PLATELET # BLD: 373 THOU/UL (ref 150–400)
POTASSIUM SERPL-SCNC: 4.4 MMOL/L (ref 3.5–5.1)
RBC # BLD AUTO: 4.92 MIL/UL (ref 4.5–6)
SODIUM SERPL-SCNC: 141 MMOL/L (ref 136–145)
WBC # BLD AUTO: 25.1 THOU/UL (ref 4–11)

## 2021-08-27 PROCEDURE — 0W9940Z DRAINAGE OF RIGHT PLEURAL CAVITY WITH DRAINAGE DEVICE, PERCUTANEOUS ENDOSCOPIC APPROACH: ICD-10-PCS

## 2021-08-27 PROCEDURE — 0BNF4ZZ RELEASE RIGHT LOWER LUNG LOBE, PERCUTANEOUS ENDOSCOPIC APPROACH: ICD-10-PCS | Performed by: THORACIC SURGERY (CARDIOTHORACIC VASCULAR SURGERY)

## 2021-08-27 PROCEDURE — 0BNN4ZZ RELEASE RIGHT PLEURA, PERCUTANEOUS ENDOSCOPIC APPROACH: ICD-10-PCS

## 2021-08-27 PROCEDURE — 0BND4ZZ RELEASE RIGHT MIDDLE LUNG LOBE, PERCUTANEOUS ENDOSCOPIC APPROACH: ICD-10-PCS

## 2021-08-27 NOTE — NUR
Chart review, COVID +, isolation. Going for procedure today. No anticipated dc
over the weekend, will cont. following as needed for dc needs.

## 2021-08-27 NOTE — NUR
PATIENT HAS THOROCOTOMY PROCEDURE WITH RIGHT DECORTICATION OF LUNG TODAY.
PATIENT RETURNS FROM PROCEDURE AT 1420. FENTANYL PCA INITIATED. 13L HFNC.
PATIENT PROGRESSING. CHEST TUBE TO RIGHT. NO BM.

## 2021-08-28 VITALS — SYSTOLIC BLOOD PRESSURE: 96 MMHG | DIASTOLIC BLOOD PRESSURE: 61 MMHG

## 2021-08-28 VITALS — DIASTOLIC BLOOD PRESSURE: 63 MMHG | SYSTOLIC BLOOD PRESSURE: 98 MMHG

## 2021-08-28 VITALS — SYSTOLIC BLOOD PRESSURE: 99 MMHG | DIASTOLIC BLOOD PRESSURE: 58 MMHG

## 2021-08-28 VITALS — SYSTOLIC BLOOD PRESSURE: 101 MMHG | DIASTOLIC BLOOD PRESSURE: 63 MMHG

## 2021-08-28 VITALS — SYSTOLIC BLOOD PRESSURE: 101 MMHG | DIASTOLIC BLOOD PRESSURE: 57 MMHG

## 2021-08-28 VITALS — SYSTOLIC BLOOD PRESSURE: 102 MMHG | DIASTOLIC BLOOD PRESSURE: 69 MMHG

## 2021-08-28 VITALS — DIASTOLIC BLOOD PRESSURE: 56 MMHG | SYSTOLIC BLOOD PRESSURE: 87 MMHG

## 2021-08-28 VITALS — DIASTOLIC BLOOD PRESSURE: 64 MMHG | SYSTOLIC BLOOD PRESSURE: 96 MMHG

## 2021-08-28 VITALS — DIASTOLIC BLOOD PRESSURE: 56 MMHG | SYSTOLIC BLOOD PRESSURE: 92 MMHG

## 2021-08-28 VITALS — SYSTOLIC BLOOD PRESSURE: 103 MMHG | DIASTOLIC BLOOD PRESSURE: 66 MMHG

## 2021-08-28 VITALS — SYSTOLIC BLOOD PRESSURE: 100 MMHG | DIASTOLIC BLOOD PRESSURE: 55 MMHG

## 2021-08-28 VITALS — SYSTOLIC BLOOD PRESSURE: 97 MMHG | DIASTOLIC BLOOD PRESSURE: 54 MMHG

## 2021-08-28 VITALS — SYSTOLIC BLOOD PRESSURE: 99 MMHG | DIASTOLIC BLOOD PRESSURE: 64 MMHG

## 2021-08-28 VITALS — SYSTOLIC BLOOD PRESSURE: 95 MMHG | DIASTOLIC BLOOD PRESSURE: 61 MMHG

## 2021-08-28 VITALS — DIASTOLIC BLOOD PRESSURE: 57 MMHG | SYSTOLIC BLOOD PRESSURE: 93 MMHG

## 2021-08-28 VITALS — SYSTOLIC BLOOD PRESSURE: 151 MMHG | DIASTOLIC BLOOD PRESSURE: 127 MMHG

## 2021-08-28 VITALS — DIASTOLIC BLOOD PRESSURE: 57 MMHG | SYSTOLIC BLOOD PRESSURE: 89 MMHG

## 2021-08-28 VITALS — DIASTOLIC BLOOD PRESSURE: 62 MMHG | SYSTOLIC BLOOD PRESSURE: 96 MMHG

## 2021-08-28 VITALS — SYSTOLIC BLOOD PRESSURE: 107 MMHG | DIASTOLIC BLOOD PRESSURE: 67 MMHG

## 2021-08-28 VITALS — DIASTOLIC BLOOD PRESSURE: 68 MMHG | SYSTOLIC BLOOD PRESSURE: 115 MMHG

## 2021-08-28 VITALS — SYSTOLIC BLOOD PRESSURE: 102 MMHG | DIASTOLIC BLOOD PRESSURE: 70 MMHG

## 2021-08-28 VITALS — DIASTOLIC BLOOD PRESSURE: 70 MMHG | SYSTOLIC BLOOD PRESSURE: 105 MMHG

## 2021-08-28 VITALS — SYSTOLIC BLOOD PRESSURE: 103 MMHG | DIASTOLIC BLOOD PRESSURE: 69 MMHG

## 2021-08-28 VITALS — SYSTOLIC BLOOD PRESSURE: 104 MMHG | DIASTOLIC BLOOD PRESSURE: 67 MMHG

## 2021-08-28 VITALS — SYSTOLIC BLOOD PRESSURE: 98 MMHG | DIASTOLIC BLOOD PRESSURE: 62 MMHG

## 2021-08-28 VITALS — SYSTOLIC BLOOD PRESSURE: 96 MMHG | DIASTOLIC BLOOD PRESSURE: 62 MMHG

## 2021-08-28 VITALS — SYSTOLIC BLOOD PRESSURE: 99 MMHG | DIASTOLIC BLOOD PRESSURE: 68 MMHG

## 2021-08-28 LAB
ANION GAP SERPL CALC-SCNC: 7 MMOL/L (ref 7–16)
BUN SERPL-MCNC: 17 MG/DL (ref 7–18)
CALCIUM SERPL-MCNC: 8 MG/DL (ref 8.5–10.1)
CHLORIDE SERPL-SCNC: 106 MMOL/L (ref 98–107)
CO2 SERPL-SCNC: 28 MMOL/L (ref 21–32)
CREAT SERPL-MCNC: 0.7 MG/DL (ref 0.7–1.3)
ERYTHROCYTE [DISTWIDTH] IN BLOOD BY AUTOMATED COUNT: 14.9 % (ref 10.5–14.5)
GLUCOSE SERPL-MCNC: 92 MG/DL (ref 74–106)
HCT VFR BLD CALC: 37.5 % (ref 42–52)
HGB BLD-MCNC: 12.1 GM/DL (ref 14–18)
MCH RBC QN AUTO: 26.9 PG (ref 26–34)
MCHC RBC AUTO-ENTMCNC: 32.3 G/DL (ref 28–37)
MCV RBC: 83.1 FL (ref 80–100)
PLATELET # BLD: 321 THOU/UL (ref 150–400)
POTASSIUM SERPL-SCNC: 4.2 MMOL/L (ref 3.5–5.1)
RBC # BLD AUTO: 4.51 MIL/UL (ref 4.5–6)
SODIUM SERPL-SCNC: 141 MMOL/L (ref 136–145)
WBC # BLD AUTO: 17.4 THOU/UL (ref 4–11)

## 2021-08-28 NOTE — NUR
Notified Dr.Forman hughes of EKG result. No new intervention at this time.
Pt remains CP free. VSS. Will monitor him closely.

## 2021-08-28 NOTE — NUR
Pt with nonsustained SVT , rates 180-190's. Pt denies of any CP or chest
discomfort, BP stable, he performed Valsava maneuver w/o any improvement.
Noified , see new order. Will obtain EKG per protocol.

## 2021-08-28 NOTE — NUR
PATIENT ALERT/ORIENTED THROUGH OUT SHIFT. DROWSY THIS MORNING BUT MORE ALERT
AS THE DAY WENT ON. CHANGED PCA PUMP TO HAVE NO BASAL RATE PER ORDERS FROM DR. RIVERA. TITRATING DOWN ON OXYGEN FROM 13 L NC TO 8 L. PATIENT REFUSED MULTIPLE
TIMES TO GET UP TO CHAIR, STATING HE'S NOT HAVING PAIN BUT HE IS "SORE" AND
WOULD RATHER WAIT UNTIL TOMORROW. ENCOURAGED THE NEED FOR ACTIVITY AND
CONTINUED TO REFUSE. LYNCH DISCONTINUED, VOIDING POST REMOVAL. OVERALL
PROGRESSING. R LATER CHEST TUBE REMAINS IN PLACE TO -20 SUCTION. SUBQ AIR
NOTED TO AREA SURROUNDING.

## 2021-08-28 NOTE — NUR
PCA PUMP CLEARED WITH 2ND RN. CHANGED PUMP TO INFUSE WITH NO BASAL RIGHT.
PATIENT CAN BOLUS AS PER ORDERED.

## 2021-08-29 VITALS — DIASTOLIC BLOOD PRESSURE: 42 MMHG | SYSTOLIC BLOOD PRESSURE: 75 MMHG

## 2021-08-29 VITALS — DIASTOLIC BLOOD PRESSURE: 53 MMHG | SYSTOLIC BLOOD PRESSURE: 90 MMHG

## 2021-08-29 VITALS — SYSTOLIC BLOOD PRESSURE: 101 MMHG | DIASTOLIC BLOOD PRESSURE: 60 MMHG

## 2021-08-29 VITALS — DIASTOLIC BLOOD PRESSURE: 48 MMHG | SYSTOLIC BLOOD PRESSURE: 80 MMHG

## 2021-08-29 VITALS — SYSTOLIC BLOOD PRESSURE: 84 MMHG | DIASTOLIC BLOOD PRESSURE: 45 MMHG

## 2021-08-29 VITALS — SYSTOLIC BLOOD PRESSURE: 89 MMHG | DIASTOLIC BLOOD PRESSURE: 52 MMHG

## 2021-08-29 VITALS — DIASTOLIC BLOOD PRESSURE: 53 MMHG | SYSTOLIC BLOOD PRESSURE: 89 MMHG

## 2021-08-29 VITALS — SYSTOLIC BLOOD PRESSURE: 82 MMHG | DIASTOLIC BLOOD PRESSURE: 53 MMHG

## 2021-08-29 VITALS — SYSTOLIC BLOOD PRESSURE: 76 MMHG | DIASTOLIC BLOOD PRESSURE: 45 MMHG

## 2021-08-29 VITALS — DIASTOLIC BLOOD PRESSURE: 59 MMHG | SYSTOLIC BLOOD PRESSURE: 101 MMHG

## 2021-08-29 VITALS — SYSTOLIC BLOOD PRESSURE: 102 MMHG | DIASTOLIC BLOOD PRESSURE: 61 MMHG

## 2021-08-29 VITALS — DIASTOLIC BLOOD PRESSURE: 54 MMHG | SYSTOLIC BLOOD PRESSURE: 91 MMHG

## 2021-08-29 VITALS — DIASTOLIC BLOOD PRESSURE: 55 MMHG | SYSTOLIC BLOOD PRESSURE: 95 MMHG

## 2021-08-29 VITALS — SYSTOLIC BLOOD PRESSURE: 100 MMHG | DIASTOLIC BLOOD PRESSURE: 56 MMHG

## 2021-08-29 VITALS — DIASTOLIC BLOOD PRESSURE: 65 MMHG | SYSTOLIC BLOOD PRESSURE: 93 MMHG

## 2021-08-29 VITALS — SYSTOLIC BLOOD PRESSURE: 105 MMHG | DIASTOLIC BLOOD PRESSURE: 61 MMHG

## 2021-08-29 VITALS — DIASTOLIC BLOOD PRESSURE: 74 MMHG | SYSTOLIC BLOOD PRESSURE: 105 MMHG

## 2021-08-29 VITALS — DIASTOLIC BLOOD PRESSURE: 52 MMHG | SYSTOLIC BLOOD PRESSURE: 91 MMHG

## 2021-08-29 VITALS — DIASTOLIC BLOOD PRESSURE: 51 MMHG | SYSTOLIC BLOOD PRESSURE: 90 MMHG

## 2021-08-29 VITALS — SYSTOLIC BLOOD PRESSURE: 100 MMHG | DIASTOLIC BLOOD PRESSURE: 58 MMHG

## 2021-08-29 VITALS — SYSTOLIC BLOOD PRESSURE: 94 MMHG | DIASTOLIC BLOOD PRESSURE: 55 MMHG

## 2021-08-29 VITALS — DIASTOLIC BLOOD PRESSURE: 64 MMHG | SYSTOLIC BLOOD PRESSURE: 100 MMHG

## 2021-08-29 VITALS — DIASTOLIC BLOOD PRESSURE: 43 MMHG | SYSTOLIC BLOOD PRESSURE: 82 MMHG

## 2021-08-29 VITALS — DIASTOLIC BLOOD PRESSURE: 49 MMHG | SYSTOLIC BLOOD PRESSURE: 88 MMHG

## 2021-08-29 VITALS — SYSTOLIC BLOOD PRESSURE: 85 MMHG | DIASTOLIC BLOOD PRESSURE: 51 MMHG

## 2021-08-29 VITALS — SYSTOLIC BLOOD PRESSURE: 87 MMHG | DIASTOLIC BLOOD PRESSURE: 56 MMHG

## 2021-08-29 VITALS — DIASTOLIC BLOOD PRESSURE: 49 MMHG | SYSTOLIC BLOOD PRESSURE: 84 MMHG

## 2021-08-29 VITALS — DIASTOLIC BLOOD PRESSURE: 48 MMHG | SYSTOLIC BLOOD PRESSURE: 77 MMHG

## 2021-08-29 VITALS — DIASTOLIC BLOOD PRESSURE: 43 MMHG | SYSTOLIC BLOOD PRESSURE: 76 MMHG

## 2021-08-29 VITALS — SYSTOLIC BLOOD PRESSURE: 102 MMHG | DIASTOLIC BLOOD PRESSURE: 62 MMHG

## 2021-08-29 VITALS — DIASTOLIC BLOOD PRESSURE: 53 MMHG | SYSTOLIC BLOOD PRESSURE: 88 MMHG

## 2021-08-29 VITALS — SYSTOLIC BLOOD PRESSURE: 92 MMHG | DIASTOLIC BLOOD PRESSURE: 53 MMHG

## 2021-08-29 VITALS — DIASTOLIC BLOOD PRESSURE: 63 MMHG | SYSTOLIC BLOOD PRESSURE: 96 MMHG

## 2021-08-29 VITALS — SYSTOLIC BLOOD PRESSURE: 87 MMHG | DIASTOLIC BLOOD PRESSURE: 46 MMHG

## 2021-08-29 VITALS — DIASTOLIC BLOOD PRESSURE: 48 MMHG | SYSTOLIC BLOOD PRESSURE: 82 MMHG

## 2021-08-29 VITALS — SYSTOLIC BLOOD PRESSURE: 96 MMHG | DIASTOLIC BLOOD PRESSURE: 62 MMHG

## 2021-08-29 VITALS — SYSTOLIC BLOOD PRESSURE: 105 MMHG | DIASTOLIC BLOOD PRESSURE: 79 MMHG

## 2021-08-29 VITALS — SYSTOLIC BLOOD PRESSURE: 86 MMHG | DIASTOLIC BLOOD PRESSURE: 52 MMHG

## 2021-08-29 VITALS — DIASTOLIC BLOOD PRESSURE: 63 MMHG | SYSTOLIC BLOOD PRESSURE: 92 MMHG

## 2021-08-29 VITALS — DIASTOLIC BLOOD PRESSURE: 51 MMHG | SYSTOLIC BLOOD PRESSURE: 85 MMHG

## 2021-08-29 VITALS — SYSTOLIC BLOOD PRESSURE: 83 MMHG | DIASTOLIC BLOOD PRESSURE: 60 MMHG

## 2021-08-29 VITALS — SYSTOLIC BLOOD PRESSURE: 91 MMHG | DIASTOLIC BLOOD PRESSURE: 55 MMHG

## 2021-08-29 VITALS — SYSTOLIC BLOOD PRESSURE: 87 MMHG | DIASTOLIC BLOOD PRESSURE: 55 MMHG

## 2021-08-29 VITALS — SYSTOLIC BLOOD PRESSURE: 95 MMHG | DIASTOLIC BLOOD PRESSURE: 58 MMHG

## 2021-08-29 VITALS — DIASTOLIC BLOOD PRESSURE: 55 MMHG | SYSTOLIC BLOOD PRESSURE: 92 MMHG

## 2021-08-29 VITALS — DIASTOLIC BLOOD PRESSURE: 56 MMHG | SYSTOLIC BLOOD PRESSURE: 94 MMHG

## 2021-08-29 VITALS — DIASTOLIC BLOOD PRESSURE: 50 MMHG | SYSTOLIC BLOOD PRESSURE: 90 MMHG

## 2021-08-29 VITALS — DIASTOLIC BLOOD PRESSURE: 63 MMHG | SYSTOLIC BLOOD PRESSURE: 94 MMHG

## 2021-08-29 VITALS — DIASTOLIC BLOOD PRESSURE: 62 MMHG | SYSTOLIC BLOOD PRESSURE: 100 MMHG

## 2021-08-29 VITALS — SYSTOLIC BLOOD PRESSURE: 100 MMHG | DIASTOLIC BLOOD PRESSURE: 66 MMHG

## 2021-08-29 VITALS — DIASTOLIC BLOOD PRESSURE: 52 MMHG | SYSTOLIC BLOOD PRESSURE: 96 MMHG

## 2021-08-29 VITALS — DIASTOLIC BLOOD PRESSURE: 48 MMHG | SYSTOLIC BLOOD PRESSURE: 81 MMHG

## 2021-08-29 VITALS — SYSTOLIC BLOOD PRESSURE: 99 MMHG | DIASTOLIC BLOOD PRESSURE: 61 MMHG

## 2021-08-29 VITALS — SYSTOLIC BLOOD PRESSURE: 103 MMHG | DIASTOLIC BLOOD PRESSURE: 57 MMHG

## 2021-08-29 VITALS — DIASTOLIC BLOOD PRESSURE: 56 MMHG | SYSTOLIC BLOOD PRESSURE: 100 MMHG

## 2021-08-29 VITALS — SYSTOLIC BLOOD PRESSURE: 88 MMHG | DIASTOLIC BLOOD PRESSURE: 60 MMHG

## 2021-08-29 VITALS — SYSTOLIC BLOOD PRESSURE: 84 MMHG | DIASTOLIC BLOOD PRESSURE: 49 MMHG

## 2021-08-29 VITALS — SYSTOLIC BLOOD PRESSURE: 91 MMHG | DIASTOLIC BLOOD PRESSURE: 59 MMHG

## 2021-08-29 VITALS — SYSTOLIC BLOOD PRESSURE: 93 MMHG | DIASTOLIC BLOOD PRESSURE: 54 MMHG

## 2021-08-29 VITALS — DIASTOLIC BLOOD PRESSURE: 67 MMHG | SYSTOLIC BLOOD PRESSURE: 109 MMHG

## 2021-08-29 VITALS — DIASTOLIC BLOOD PRESSURE: 62 MMHG | SYSTOLIC BLOOD PRESSURE: 98 MMHG

## 2021-08-29 VITALS — DIASTOLIC BLOOD PRESSURE: 52 MMHG | SYSTOLIC BLOOD PRESSURE: 80 MMHG

## 2021-08-29 VITALS — DIASTOLIC BLOOD PRESSURE: 55 MMHG | SYSTOLIC BLOOD PRESSURE: 89 MMHG

## 2021-08-29 VITALS — SYSTOLIC BLOOD PRESSURE: 97 MMHG | DIASTOLIC BLOOD PRESSURE: 56 MMHG

## 2021-08-29 LAB
ANION GAP SERPL CALC-SCNC: 8 MMOL/L (ref 7–16)
BUN SERPL-MCNC: 13 MG/DL (ref 7–18)
CALCIUM SERPL-MCNC: 8.5 MG/DL (ref 8.5–10.1)
CHLORIDE SERPL-SCNC: 98 MMOL/L (ref 98–107)
CO2 SERPL-SCNC: 29 MMOL/L (ref 21–32)
CREAT SERPL-MCNC: 0.7 MG/DL (ref 0.7–1.3)
ERYTHROCYTE [DISTWIDTH] IN BLOOD BY AUTOMATED COUNT: 14.3 % (ref 10.5–14.5)
GLUCOSE SERPL-MCNC: 100 MG/DL (ref 74–106)
HCT VFR BLD CALC: 39.3 % (ref 42–52)
HGB BLD-MCNC: 13 GM/DL (ref 14–18)
MAGNESIUM SERPL-MCNC: 1.9 MG/DL (ref 1.8–2.4)
MCH RBC QN AUTO: 27 PG (ref 26–34)
MCHC RBC AUTO-ENTMCNC: 33 G/DL (ref 28–37)
MCV RBC: 81.9 FL (ref 80–100)
PLATELET # BLD: 330 THOU/UL (ref 150–400)
POTASSIUM SERPL-SCNC: 3.9 MMOL/L (ref 3.5–5.1)
RBC # BLD AUTO: 4.8 MIL/UL (ref 4.5–6)
SODIUM SERPL-SCNC: 135 MMOL/L (ref 136–145)
WBC # BLD AUTO: 15.7 THOU/UL (ref 4–11)

## 2021-08-29 NOTE — NUR
No more episode of SVT episodes since received amiodarone IV
bolus. He was slightly hypotensive with cardizem gtt. BPs has improved after
stopped cardizem gtt. CT on Rt side remains functional properly, continue to
have airleak. Denies of any CP or trouble breathing. Pain has been adequated
control with fentanyl PCA. Continue to monitor him closely.
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
## 2021-08-29 NOTE — NUR
CALLED  REGARDING EKG READING DONE LAST NIGHT, ALSO SEEN WITH
ELEVATED ST ON THE MONITOR, WHEN INQUIRED, STATED BY MD, ST ELEVATION SEEN IN
ALL PRIOR EKG STRIPS, NOT NECESSARY TO DRAW CARDIAC MARKER LABS.

## 2021-08-29 NOTE — NUR
PT REMAINS IN ISOLATION DUE TO COVID-19, BREATHING SLIGHTLY TACHY AT A RATE OF
20'S THROUGHOUT THE DAY. PT ALSO REMAINED HYPOTENSIVE AND TACHYCARDIC
THROUGHOUT THE DAY, RIGHT CHEST TUBE CONTINUES TO LEAK AND PT HAS NOTICABLE
SUB-Q ALONG THE RIGHT FLANK. CARDIOLOGY TEAM NOTIFIED OF FINDINGS. PT ABLE TO
AMBULATE TO BEDSIDE CHAIR WITH ASSISTANCE TO HELP WITH IV POLE/CHEST TUBE. PT
TOLERATED WELL, REMAINED IN CHAIR FOR APPROX 4HRS, PT HAD NO ADVERSE EVENTS
TODAY.

## 2021-08-29 NOTE — EKG
[address] [employer]
[place]  [address]
Phone:  [phone]                    ELECTROCARDIOGRAM REPORT      
_______________________________________________________________________________
 
Name:       MINDY GILL SR              Room #:         238-P       [facility] [place]  
M.R.#:      [id number]     Account #:      [id number]  
Admission:  21    Attend Phys:    Forrest Guerrero MD    
Discharge:              Date of Birth:  [birth year]  
                                                          Report #: [id number]
   [id number]
_______________________________________________________________________________
                          [facility]
                                       
Test Date:    2021               Test Time:    23:44:10
Pat Name:     MINDY GILL            Department:   
Patient ID:   [id number]            Room:         238 P
Gender:       M                        Technician:   [id number]
:          1965               Requested By: Forrest Guerrero
Order Number: [id number] MD:   Aristides Good
                                 Measurements
Intervals                              Axis          
Rate:         115                      P:            81
WI:           167                      QRS:          68
QRSD:         95                       T:            60
QT:           323                                    
QTc:          447                                    
                           Interpretive Statements
Sinus tachycardia with atrial premature complexes
Diffuse ST segment elevation, consider early repolarization, pericarditis, or
 
injury
No previous ECG available for comparison
Electronically Signed On 2021 12:56:43 CDT by Aristides Good
[email or web address]
 
 
 
 
 
 
 
 
 
 
 
 
 
 
 
 
 
 
 
 
  <ELECTRONICALLY SIGNED>
   By: Aristides Good MD, [facility]   
  21     1256
D: 21 2344                           _____________________________________
T: 21                           Aristides Good MD, FACC     /EPI

## 2021-08-30 VITALS — DIASTOLIC BLOOD PRESSURE: 57 MMHG | SYSTOLIC BLOOD PRESSURE: 83 MMHG

## 2021-08-30 VITALS — DIASTOLIC BLOOD PRESSURE: 66 MMHG | SYSTOLIC BLOOD PRESSURE: 107 MMHG

## 2021-08-30 VITALS — SYSTOLIC BLOOD PRESSURE: 92 MMHG | DIASTOLIC BLOOD PRESSURE: 52 MMHG

## 2021-08-30 VITALS — SYSTOLIC BLOOD PRESSURE: 99 MMHG | DIASTOLIC BLOOD PRESSURE: 65 MMHG

## 2021-08-30 VITALS — SYSTOLIC BLOOD PRESSURE: 111 MMHG | DIASTOLIC BLOOD PRESSURE: 58 MMHG

## 2021-08-30 VITALS — DIASTOLIC BLOOD PRESSURE: 50 MMHG | SYSTOLIC BLOOD PRESSURE: 90 MMHG

## 2021-08-30 VITALS — DIASTOLIC BLOOD PRESSURE: 57 MMHG | SYSTOLIC BLOOD PRESSURE: 90 MMHG

## 2021-08-30 VITALS — DIASTOLIC BLOOD PRESSURE: 58 MMHG | SYSTOLIC BLOOD PRESSURE: 102 MMHG

## 2021-08-30 VITALS — DIASTOLIC BLOOD PRESSURE: 60 MMHG | SYSTOLIC BLOOD PRESSURE: 93 MMHG

## 2021-08-30 VITALS — SYSTOLIC BLOOD PRESSURE: 134 MMHG | DIASTOLIC BLOOD PRESSURE: 92 MMHG

## 2021-08-30 VITALS — SYSTOLIC BLOOD PRESSURE: 89 MMHG | DIASTOLIC BLOOD PRESSURE: 51 MMHG

## 2021-08-30 VITALS — DIASTOLIC BLOOD PRESSURE: 57 MMHG | SYSTOLIC BLOOD PRESSURE: 98 MMHG

## 2021-08-30 VITALS — SYSTOLIC BLOOD PRESSURE: 82 MMHG | DIASTOLIC BLOOD PRESSURE: 45 MMHG

## 2021-08-30 VITALS — SYSTOLIC BLOOD PRESSURE: 101 MMHG | DIASTOLIC BLOOD PRESSURE: 62 MMHG

## 2021-08-30 VITALS — DIASTOLIC BLOOD PRESSURE: 55 MMHG | SYSTOLIC BLOOD PRESSURE: 90 MMHG

## 2021-08-30 VITALS — SYSTOLIC BLOOD PRESSURE: 101 MMHG | DIASTOLIC BLOOD PRESSURE: 59 MMHG

## 2021-08-30 VITALS — DIASTOLIC BLOOD PRESSURE: 64 MMHG | SYSTOLIC BLOOD PRESSURE: 99 MMHG

## 2021-08-30 VITALS — DIASTOLIC BLOOD PRESSURE: 59 MMHG | SYSTOLIC BLOOD PRESSURE: 95 MMHG

## 2021-08-30 VITALS — SYSTOLIC BLOOD PRESSURE: 93 MMHG | DIASTOLIC BLOOD PRESSURE: 55 MMHG

## 2021-08-30 VITALS — SYSTOLIC BLOOD PRESSURE: 91 MMHG | DIASTOLIC BLOOD PRESSURE: 52 MMHG

## 2021-08-30 VITALS — SYSTOLIC BLOOD PRESSURE: 94 MMHG | DIASTOLIC BLOOD PRESSURE: 54 MMHG

## 2021-08-30 VITALS — DIASTOLIC BLOOD PRESSURE: 60 MMHG | SYSTOLIC BLOOD PRESSURE: 103 MMHG

## 2021-08-30 VITALS — SYSTOLIC BLOOD PRESSURE: 90 MMHG | DIASTOLIC BLOOD PRESSURE: 51 MMHG

## 2021-08-30 VITALS — DIASTOLIC BLOOD PRESSURE: 55 MMHG | SYSTOLIC BLOOD PRESSURE: 94 MMHG

## 2021-08-30 VITALS — SYSTOLIC BLOOD PRESSURE: 104 MMHG | DIASTOLIC BLOOD PRESSURE: 57 MMHG

## 2021-08-30 VITALS — SYSTOLIC BLOOD PRESSURE: 103 MMHG | DIASTOLIC BLOOD PRESSURE: 68 MMHG

## 2021-08-30 VITALS — SYSTOLIC BLOOD PRESSURE: 103 MMHG | DIASTOLIC BLOOD PRESSURE: 61 MMHG

## 2021-08-30 VITALS — SYSTOLIC BLOOD PRESSURE: 78 MMHG | DIASTOLIC BLOOD PRESSURE: 46 MMHG

## 2021-08-30 VITALS — DIASTOLIC BLOOD PRESSURE: 53 MMHG | SYSTOLIC BLOOD PRESSURE: 96 MMHG

## 2021-08-30 VITALS — SYSTOLIC BLOOD PRESSURE: 82 MMHG | DIASTOLIC BLOOD PRESSURE: 46 MMHG

## 2021-08-30 VITALS — SYSTOLIC BLOOD PRESSURE: 90 MMHG | DIASTOLIC BLOOD PRESSURE: 52 MMHG

## 2021-08-30 VITALS — SYSTOLIC BLOOD PRESSURE: 93 MMHG | DIASTOLIC BLOOD PRESSURE: 52 MMHG

## 2021-08-30 VITALS — SYSTOLIC BLOOD PRESSURE: 92 MMHG | DIASTOLIC BLOOD PRESSURE: 56 MMHG

## 2021-08-30 VITALS — DIASTOLIC BLOOD PRESSURE: 69 MMHG | SYSTOLIC BLOOD PRESSURE: 100 MMHG

## 2021-08-30 VITALS — SYSTOLIC BLOOD PRESSURE: 109 MMHG | DIASTOLIC BLOOD PRESSURE: 64 MMHG

## 2021-08-30 VITALS — DIASTOLIC BLOOD PRESSURE: 46 MMHG | SYSTOLIC BLOOD PRESSURE: 82 MMHG

## 2021-08-30 VITALS — SYSTOLIC BLOOD PRESSURE: 79 MMHG | DIASTOLIC BLOOD PRESSURE: 44 MMHG

## 2021-08-30 VITALS — SYSTOLIC BLOOD PRESSURE: 93 MMHG | DIASTOLIC BLOOD PRESSURE: 58 MMHG

## 2021-08-30 VITALS — SYSTOLIC BLOOD PRESSURE: 98 MMHG | DIASTOLIC BLOOD PRESSURE: 68 MMHG

## 2021-08-30 VITALS — SYSTOLIC BLOOD PRESSURE: 87 MMHG | DIASTOLIC BLOOD PRESSURE: 55 MMHG

## 2021-08-30 VITALS — DIASTOLIC BLOOD PRESSURE: 68 MMHG | SYSTOLIC BLOOD PRESSURE: 110 MMHG

## 2021-08-30 NOTE — NUR
DISCUSSED WITH PHYSICIANS ASSISTANCE AT BEDSIDE W/ HOSPITALIST, PLAN FOR THE
PATIENT AT THIS TIME IS TO PROMOTE INCENTIVE SPIROMETRY, WALK AS POSSIBLE,
MOBILIZE LUNGS, SUFFICIENT DIETING. REGARDING THE CHEST TUBE, AFTER
DECORTICATION AND PROCEDURES, PULMONARY FUNCTION TO IMPROVE, CONTINUE TO
MONITOR CLOSELY ATTENTIONING TO OXYGENATION/CHANGE IN PT STATUS/MOBILIZATION
OF CREPITUS
RN CONTINUING TO MONITOR.
DISUCSSING THESE FINDINGS WITH THE PATINET, PT VERBALIZES UNDERSTANDING

## 2021-08-30 NOTE — NUR
Chart review, unable to visit with cristin Denis/t chalino and enhanced isolation.
O2 4 L per nasal cannula. Discuss during los with hospitalist and unit rounds.
Encourage him to use IS. Has chest tube.

## 2021-08-30 NOTE — NUR
ASSUME CARE 1900. PT STABLE. BP RUNS LOW/SBP IN 80s-LOW 100s. MAP BETWEEN
55-60s. 500ML BOLUS X 1, GIVEN TO STABILIZE BP. DENIES ANY PAIN. PCA D/C'ED,
PT ON PILLS FOR PAIN AS NEEDED. A/O X 4.  POOR TOLERANCE TO ACTIVITY.
COULD BENEFIT FROM PT/OT. UP TO CHAIR DURING THE DAY. NO DISTRESS NOTED
THROUGH THE SHIFT. SR/ST OM MONITOR. NO DYSRRHYTHMIA NOTED. ASSESSMENT AS
CHARTED. PROGRESSING WELL WITH POC. CHEST TUBE STILL IN PLACE/RIGHT SITE WITH
INTENSE BUBBLING. DRESSING CDI. PLAN IS TO CONTINUE TO MONITOR AND MANAGE
RESPIRATORY FUNCTION, AND IMPROVE ACTIVITY TOLERANE/STRENGTH. WILL CONTINUE TO
MONITOR AND FOLLOW WITH POC

## 2021-08-31 VITALS — DIASTOLIC BLOOD PRESSURE: 59 MMHG | SYSTOLIC BLOOD PRESSURE: 97 MMHG

## 2021-08-31 VITALS — DIASTOLIC BLOOD PRESSURE: 70 MMHG | SYSTOLIC BLOOD PRESSURE: 109 MMHG

## 2021-08-31 VITALS — SYSTOLIC BLOOD PRESSURE: 108 MMHG | DIASTOLIC BLOOD PRESSURE: 59 MMHG

## 2021-08-31 VITALS — SYSTOLIC BLOOD PRESSURE: 109 MMHG | DIASTOLIC BLOOD PRESSURE: 61 MMHG

## 2021-08-31 VITALS — DIASTOLIC BLOOD PRESSURE: 66 MMHG | SYSTOLIC BLOOD PRESSURE: 116 MMHG

## 2021-08-31 VITALS — DIASTOLIC BLOOD PRESSURE: 71 MMHG | SYSTOLIC BLOOD PRESSURE: 105 MMHG

## 2021-08-31 VITALS — DIASTOLIC BLOOD PRESSURE: 58 MMHG | SYSTOLIC BLOOD PRESSURE: 97 MMHG

## 2021-08-31 VITALS — DIASTOLIC BLOOD PRESSURE: 63 MMHG | SYSTOLIC BLOOD PRESSURE: 100 MMHG

## 2021-08-31 VITALS — SYSTOLIC BLOOD PRESSURE: 93 MMHG | DIASTOLIC BLOOD PRESSURE: 54 MMHG

## 2021-08-31 VITALS — SYSTOLIC BLOOD PRESSURE: 106 MMHG | DIASTOLIC BLOOD PRESSURE: 64 MMHG

## 2021-08-31 VITALS — SYSTOLIC BLOOD PRESSURE: 124 MMHG | DIASTOLIC BLOOD PRESSURE: 78 MMHG

## 2021-08-31 VITALS — DIASTOLIC BLOOD PRESSURE: 60 MMHG | SYSTOLIC BLOOD PRESSURE: 104 MMHG

## 2021-08-31 VITALS — SYSTOLIC BLOOD PRESSURE: 102 MMHG | DIASTOLIC BLOOD PRESSURE: 59 MMHG

## 2021-08-31 VITALS — SYSTOLIC BLOOD PRESSURE: 94 MMHG | DIASTOLIC BLOOD PRESSURE: 53 MMHG

## 2021-08-31 VITALS — SYSTOLIC BLOOD PRESSURE: 96 MMHG | DIASTOLIC BLOOD PRESSURE: 60 MMHG

## 2021-08-31 VITALS — SYSTOLIC BLOOD PRESSURE: 104 MMHG | DIASTOLIC BLOOD PRESSURE: 64 MMHG

## 2021-08-31 VITALS — DIASTOLIC BLOOD PRESSURE: 79 MMHG | SYSTOLIC BLOOD PRESSURE: 119 MMHG

## 2021-08-31 VITALS — SYSTOLIC BLOOD PRESSURE: 102 MMHG | DIASTOLIC BLOOD PRESSURE: 58 MMHG

## 2021-08-31 NOTE — NUR
PT TRANSFERED HERE FROM ICU AT 1430. PT A/O X 4,DENIES PAIN.CCT-SR ON MONITOR.
VSS UPON TRANSFER. R CHEST TUBE INTACT, NO DRAINAGE ON DRESSING AT THIS TIME.
UPDATED TO ROOM, RT AT BEDSIDE FOR BREATHING TREATMENT. PT TRANSFERED TO BSC
AND THEN TO BED WITH SBA. STEADY ON HIS FEET. WILL CONT TO MONITOR AND FOLLOW
POC.

## 2021-08-31 NOTE — NUR
ASSUME CARE 1900. PT/VITALS STABLE. SBP RUNS IN 90s- LOW 100s, WITH MAP FROM
65-HIGH 70s. DENEIS ANY PAIN. MODERATE TOLERANCE TO ACTIVITY. 3LNC TOLERATING
WELL. SATS ABOUT 93%. ADEQUATE REST NOTED/NO DISTRESS OR DYSRRHYTHMIA NOTED.
SR ON MONITOR. ASSESSMENT AS CHARTED. PROGRESSING WELL WITH POC. PLAN IS
TRANSFER TO STEP DOWN UNIT FOR CONTINUUM OF CARE. WILL CONITNUE TO MONITOR AND
FOLLOW WITH POC

## 2021-08-31 NOTE — NUR
PT IS PROGRESSING TOWARDS DISCHARGE AT THIS TIME, CHEST TUBE DRESSING WAS
CHANGED TODAY, MODERATE SEROSANGUINEOUS FLUID SATURATED THE GAUZE, AIR
BUBBLING PRESENT IN THE CHEST TUBE, R AXILLA/POSTERIOR WALL CREPITUS STILL
PRESENT. PT DENIED PAIN MEDICATION WHEN ASKED. CURRENTLY AWAITING FOR TRANSFER

## 2021-09-01 VITALS — DIASTOLIC BLOOD PRESSURE: 62 MMHG | SYSTOLIC BLOOD PRESSURE: 92 MMHG

## 2021-09-01 VITALS — DIASTOLIC BLOOD PRESSURE: 64 MMHG | SYSTOLIC BLOOD PRESSURE: 96 MMHG

## 2021-09-01 VITALS — SYSTOLIC BLOOD PRESSURE: 107 MMHG | DIASTOLIC BLOOD PRESSURE: 71 MMHG

## 2021-09-01 NOTE — NUR
NO SIGNIFICANT CHANGES OVER NIGHT. PT DENIES ANY PAIN OR SIGNIFICANT SOA. SPO2
REMAINS STABLE ON 2-3L NC. RIGHT LATERAL CHEST TUBE (-20 SUCTION) PATENT AND
INTACT, DRAINING SEROSANGINUOUS FLUID. AFEBRILE. VSS. NO MAJOR COMPLAINTS THIS
SHIFT.  PROGRESSING SLOWLY TOWARD POC GOALS. WILL MONITOR FURTHER.

## 2021-09-01 NOTE — O
[facility]
[address]
[place]   [address]                     OPERATIVE REPORT              
_______________________________________________________________________________
 
Name:       MINDY GILL SR              Room #:         361-P       [facility] [place]  
M.R.#:      [id number]                       Account #:      [id number]  
Admission:  08/19/21    Attend Phys:    Forrest Guerrero MD    
Discharge:              Date of Birth:  [birth year]  
                                                          Report #: [id number]
                                                                    [id number] 
_______________________________________________________________________________
THIS REPORT FOR:  
 
cc:  FAM - Family physician unknown
     FAM - Family physician unknown                                       
     Ortiz Wilkes MD                                            ~
 
DATE OF SERVICE: 08/27/2021
 
PREOPERATIVE DIAGNOSES:  Persistent air leak and persistent pneumothorax, status
post thoracotomy.
 
POSTOPERATIVE DIAGNOSES:  Persistent air leak and persistent pneumothorax, 
status post thoracotomy.
 
OPERATIONS:  Bronchoscopy, reoperative right thoracotomy, decortication, 
resection and oversewing of the blebs and air leaks, inferior hilar release, 
pleural tent formation and application of ProGel.
 
INDICATIONS:  The patient is a 56-year-old who last week had a thoracotomy with 
resection of blebs and chemical pleurodesis.  Unfortunately, the patient has had
a persistent pneumothorax and persistent air leak.  The patient has been treated
for COVID pneumonia and it is in the throes of that all of this developed.
 
FINDINGS AND TECHNIQUE:  After general anesthesia was established, flexible 
diagnostic bronchoscopy was performed.  No endobronchial lesions were noted.  
Secretions were aspirated from the tracheobronchial tree and a double lumen tube
was positioned under bronchoscopic guidance.
 
The patient was positioned with right side up.  Exposure was obtained by opening
the thoracotomy incision.  The chest was entered through the fifth interspace 
utilizing the rib sparing nerve sparing approach that had been performed last 
time.
 
The pleural tent was still in place.  This was taken down so that we could 
better exposed the lung.
 
With better visualization, we were able to identify a thin fibrous envelope over
middle and lower lobe. Decortication was done of this to achieve better 
expansion of the middle and lower lobes.  Decortication had been done of the 
upper lobe on the previous visit, but the investing envelope was not as obvious 
then due to interference from poor lighting with the PAPR device.
 
In any event, on this visit to the operating room, better expansion of the lung 
was accomplished.
 
An inferior pleural release was performed by dividing the pericardium along the 
 
 
 
[facility]
[address]
[place]   [address]                     OPERATIVE REPORT              
_______________________________________________________________________________
 
Name:       BRIDGET VINSONMINDY              Room #:         361-P       [facility] [place]  
M.R.#:      [id number]                       Account #:      [id number]  
Admission:  08/19/21    Attend Phys:    Forrest Guerrero MD    
Discharge:              Date of Birth:  [birth year]  
                                                          Report #: [id number]
                                                                    [id number] 
_______________________________________________________________________________
 
inferior, anterior and posterior aspects of the inferior pulmonary vein to allow
the lung to elevate.
 
Attempts were then made to ligate whatever leaks we identified. In particular, 
there were several areas in the upper lobe that required oversewing with 
Prolene.
 
There was a raw area in the middle lobe and Progel was applied to this.
 
Satisfied with the ligation of the leaks and with the lung decorticated to the 
best of our ability, the pleural tent was reconstructed to provide coverage to 
the upper lobe.
 
A large bore chest tube was brought through the lowest stab wound and secured in
position.  Hemostasis was ascertained.  The chest was then closed in a way to 
preserve the rib sparing nerve sparing approach.  The patient tolerated all this
well and was recovered in the room per COVID protocol and was taken back to the 
intensive care unit in good condition having tolerated the procedure well.  All 
counts were reported as correct.
 
 
 
 
 
 
 
 
 
 
 
 
 
 
 
 
 
 
 
 
 
 
 
 
  <ELECTRONICALLY SIGNED>
   By: Ortiz Wilkes MD            
  09/01/21     0752
D: 08/28/21 0927                           _____________________________________
T: 08/28/21 1024                           Ortiz Wilkes MD              /nt

## 2021-09-01 NOTE — NUR
JESSE reviewed chart and spoke with nursing and attending physician. Pt was
transferred to  from ICU. Pt remains in Enhanced Isolation due to COVID. PT
is afebrile and on 2L of O2. Pt has chest tube in place. PT/OT erika ordered
today. [facility] consulted to determine if pt needs inpt acute rehab prior to
discharging home. [facility] has applied for [place] Medicaid Disability on pt's
behalf. SW spoke with pt via phone. Introduced role of SW. Pt is
alert/orientated and states he lives at home with his wife. Prior to
admission, pt was independent with ADLs. No use of DME. Pt does not currently
have a PCP. SW discussed possibility of going to [facility] for rehab. Pt states he is
hoping to discharge home and not need rehab. Awaiting therapy erika and [facility]
consult. SW is following to assist as needed with discharge planning.

## 2021-09-02 VITALS — SYSTOLIC BLOOD PRESSURE: 90 MMHG | DIASTOLIC BLOOD PRESSURE: 52 MMHG

## 2021-09-02 VITALS — SYSTOLIC BLOOD PRESSURE: 98 MMHG | DIASTOLIC BLOOD PRESSURE: 59 MMHG

## 2021-09-02 VITALS — SYSTOLIC BLOOD PRESSURE: 93 MMHG | DIASTOLIC BLOOD PRESSURE: 54 MMHG

## 2021-09-02 VITALS — DIASTOLIC BLOOD PRESSURE: 60 MMHG | SYSTOLIC BLOOD PRESSURE: 101 MMHG

## 2021-09-02 VITALS — DIASTOLIC BLOOD PRESSURE: 51 MMHG | SYSTOLIC BLOOD PRESSURE: 86 MMHG

## 2021-09-02 LAB
BE(VIVO): 1.6 MMOL/L
HCO3 BLD-SCNC: 23.9 MMOL/L (ref 22–26)
PCO2 BLD: 30.8 MMHG (ref 35–45)
PO2 BLD: 62.3 MMHG (ref 80–100)

## 2021-09-02 NOTE — NUR
SW reviewed chart and spoke with nursing and attending physician. Pt remains
in Enhanced Isolation due to COVID. Pt is afebrile and had to be placed on 10L
of O2 after working with therapy earlier today. Chest time in place. Pt is on
IV steorids. [facility] consulted and following for possible admission to inpt acute
rehab. SW is following to assist as needed with discharge planning.

## 2021-09-02 NOTE — NUR
PT HAD EPISODE OF DESAT WITH ACTIVITY DURING WORK WITH OT THIS MORNING. PT WAS
ABLE TO REGAIN SATS IN HIGH 80'S TO LOW 90'S WITH THE ADDITION OF 10L VIA NC.
OT STATES PT LOWEST OXYGEN RATE WAS 74% ON 2L.
PT WAS ABLE TO SUCCESSFULLY MOVE BACK FROM CHAIR TO BED WITHOUT DESAT. PT
STATES HE FEELS THAT HE 'JUST MOVED TOO EARLY IN THE DAY' AND THAT IS WHY HE
HAD DESAT. DR ROJAS, DR ALEMAN AND RT PAGED TO ASSIST WITH PT. CHEST TUBE
DRAINAGE SYSTEM CHANGED. NO DRESSING CHANGE PER DR ROJAS.
PT LATER WAS ABLE TO DECREASE O2 VIA NC TO 8L. STATES COMFORT WHILE RESTING IN
BED. APPETITE IS INCREASING.

## 2021-09-02 NOTE — NUR
PT MAKING PROGRESS TOWARDS GOALS.  PT REPORTING THAT HE HAD BEEN DOWN TO O2 AT
1L PER NC UNTIL HE WORKED WITH PT.  "THEY HAD TO TURN IT UP WHEN I WAS WORKING
WITH THEM."  O2 AT 2L PER RN SHIFT REPORT BUT ON 3L UPON INITIAL ASSESSMENT.
WILL HAVE DAY RN DECREASE O2 AT PT TOLERATES WHILE AT REST.

## 2021-09-03 VITALS — SYSTOLIC BLOOD PRESSURE: 96 MMHG | DIASTOLIC BLOOD PRESSURE: 53 MMHG

## 2021-09-03 VITALS — DIASTOLIC BLOOD PRESSURE: 53 MMHG | SYSTOLIC BLOOD PRESSURE: 104 MMHG

## 2021-09-03 VITALS — DIASTOLIC BLOOD PRESSURE: 57 MMHG | SYSTOLIC BLOOD PRESSURE: 102 MMHG

## 2021-09-03 VITALS — SYSTOLIC BLOOD PRESSURE: 100 MMHG | DIASTOLIC BLOOD PRESSURE: 63 MMHG

## 2021-09-03 NOTE — NUR
assumed patient care at 0700. tolerated on 2l/nc. no 02 sat desat noted. right
chest tube still leeking. 100ml out. will keep monitor.

## 2021-09-03 NOTE — NUR
Patient making slow progress towars outcome goals. Vital signs and ryhthm
stable. Oxygenation optimal with 6L/NC. Right chest tube intact, connected to
-20 suction with air leak. Drainage serosanguinous, frothy and more serous
this morning. Hydrocodone given for pain with relief.

## 2021-09-03 NOTE — NUR
JESSE reviewed chart and spoke with nursing and attending physician. Pt remains
in Enhanced Isolation due to COVID. Pt is afebrile and on 2L of O2. Pt has
chest tube in place and is on IV steroids. PT/OT is working with pt. SW
discussed case with [facility] rehab liaison. No weekend discharge planned. JESSE placed
call to pt's room. No answer. [employer] has applied for [place] Medicaid on pt's
behalf. JESSE is following to assist as needed with discharge planning.

## 2021-09-04 VITALS — DIASTOLIC BLOOD PRESSURE: 63 MMHG | SYSTOLIC BLOOD PRESSURE: 95 MMHG

## 2021-09-04 VITALS — SYSTOLIC BLOOD PRESSURE: 119 MMHG | DIASTOLIC BLOOD PRESSURE: 59 MMHG

## 2021-09-04 VITALS — SYSTOLIC BLOOD PRESSURE: 135 MMHG | DIASTOLIC BLOOD PRESSURE: 50 MMHG

## 2021-09-04 VITALS — SYSTOLIC BLOOD PRESSURE: 99 MMHG | DIASTOLIC BLOOD PRESSURE: 51 MMHG

## 2021-09-04 VITALS — DIASTOLIC BLOOD PRESSURE: 49 MMHG | SYSTOLIC BLOOD PRESSURE: 106 MMHG

## 2021-09-04 NOTE — NUR
PT SITTING UP IN BED, TALKING ON PHONE. O2 PER NC. CONTINUOUS PULSE OX. CHEST
TUBE DRESSING DRY AND INTACT. CHEST TUBE
HAS CONTINUED AIR LEAK. CRACKLES AT INSERTION
SITE. PT REQUESTED HS SNACK. PT CALLS FOR ASSITANCE. PT DECLINED SLIDING SCALE
COVERAGE.

## 2021-09-04 NOTE — NUR
continues on 2 liters n/c. denies concerns. he is thankful and apprectiative.
resting quietly tonight.

## 2021-09-04 NOTE — NUR
ASSUMED PATIENT CARE AT 0700. A/O X4. ON 1L/NC. NO DISTRESS NOTED RIGHT CHEST
TUBE STILL LEEKING. SLOWLY TOWARDS POC GOALS.

## 2021-09-05 VITALS — SYSTOLIC BLOOD PRESSURE: 90 MMHG | DIASTOLIC BLOOD PRESSURE: 58 MMHG

## 2021-09-05 VITALS — SYSTOLIC BLOOD PRESSURE: 94 MMHG | DIASTOLIC BLOOD PRESSURE: 63 MMHG

## 2021-09-05 VITALS — SYSTOLIC BLOOD PRESSURE: 94 MMHG | DIASTOLIC BLOOD PRESSURE: 55 MMHG

## 2021-09-05 VITALS — DIASTOLIC BLOOD PRESSURE: 63 MMHG | SYSTOLIC BLOOD PRESSURE: 111 MMHG

## 2021-09-05 VITALS — SYSTOLIC BLOOD PRESSURE: 98 MMHG | DIASTOLIC BLOOD PRESSURE: 61 MMHG

## 2021-09-05 LAB
ALBUMIN SERPL-MCNC: 2 G/DL (ref 3.4–5)
ALT SERPL-CCNC: 205 U/L (ref 30–65)
ANION GAP SERPL CALC-SCNC: 7 MMOL/L (ref 7–16)
AST SERPL-CCNC: 51 U/L (ref 15–37)
BASOPHILS NFR BLD AUTO: 0 % (ref 0–2)
BILIRUB SERPL-MCNC: 0.3 MG/DL (ref 0.2–1)
BILIRUB UR-MCNC: NEGATIVE MG/DL
BUN SERPL-MCNC: 20 MG/DL (ref 7–18)
CALCIUM SERPL-MCNC: 8.6 MG/DL (ref 8.5–10.1)
CHLORIDE SERPL-SCNC: 102 MMOL/L (ref 98–107)
CO2 SERPL-SCNC: 26 MMOL/L (ref 21–32)
COLOR UR: YELLOW
CREAT SERPL-MCNC: 0.7 MG/DL (ref 0.7–1.3)
EOSINOPHIL NFR BLD: 0 % (ref 0–3)
ERYTHROCYTE [DISTWIDTH] IN BLOOD BY AUTOMATED COUNT: 15.1 % (ref 10.5–14.5)
GLUCOSE SERPL-MCNC: 125 MG/DL (ref 74–106)
GRANULOCYTES NFR BLD MANUAL: 87 % (ref 36–66)
HCT VFR BLD CALC: 35 % (ref 42–52)
HGB BLD-MCNC: 11.4 GM/DL (ref 14–18)
KETONES UR STRIP-MCNC: NEGATIVE MG/DL
LYMPHOCYTES NFR BLD AUTO: 6 % (ref 24–44)
MAGNESIUM SERPL-MCNC: 2 MG/DL (ref 1.8–2.4)
MCH RBC QN AUTO: 27.3 PG (ref 26–34)
MCHC RBC AUTO-ENTMCNC: 32.6 G/DL (ref 28–37)
MCV RBC: 83.9 FL (ref 80–100)
MONOCYTES NFR BLD: 3 % (ref 1–8)
NEUTROPHILS # BLD: 21.7 THOU/UL (ref 1.4–8.2)
NEUTS BAND NFR BLD: 4 % (ref 0–8)
PHOSPHATE SERPL-MCNC: 3.3 MG/DL (ref 2.5–4.9)
PLATELET # BLD EST: NORMAL 10*3/UL
PLATELET # BLD: 270 THOU/UL (ref 150–400)
POTASSIUM SERPL-SCNC: 5.2 MMOL/L (ref 3.5–5.1)
PROT SERPL-MCNC: 6.2 G/DL (ref 6.4–8.2)
RBC # BLD AUTO: 4.17 MIL/UL (ref 4.5–6)
RBC # UR STRIP: NEGATIVE /UL
RBC MORPH BLD: NORMAL
SODIUM SERPL-SCNC: 135 MMOL/L (ref 136–145)
SP GR UR STRIP: 1.02 (ref 1–1.03)
URINE CLARITY: CLEAR
URINE GLUCOSE-RANDOM*: NEGATIVE
URINE LEUKOCYTES-REFLEX: NEGATIVE
URINE NITRITE-REFLEX: NEGATIVE
URINE PROTEIN (DIPSTICK): NEGATIVE
UROBILINOGEN UR STRIP-ACNC: 0.2 E.U./DL (ref 0.2–1)
WBC # BLD AUTO: 23.9 THOU/UL (ref 4–11)

## 2021-09-05 NOTE — NUR
PT SITTING IN BED WATCHING TV. CHEERFUL TALKATIVE. CHEST TUBE INTACT. CRACKLES
BUE LOBES AND BY CHEST TUBE SITE. DRESSING INTACT REINFORCED ON DAY SHIFT. PT
VERBALIZED UNDERSTANDING TO PLACE NC 02 ON PRIOR TO AMBULATION. PT CALLS FOR
ASSISTANCE.

## 2021-09-06 VITALS — DIASTOLIC BLOOD PRESSURE: 67 MMHG | SYSTOLIC BLOOD PRESSURE: 111 MMHG

## 2021-09-06 VITALS — DIASTOLIC BLOOD PRESSURE: 61 MMHG | SYSTOLIC BLOOD PRESSURE: 103 MMHG

## 2021-09-06 VITALS — SYSTOLIC BLOOD PRESSURE: 95 MMHG | DIASTOLIC BLOOD PRESSURE: 56 MMHG

## 2021-09-06 VITALS — DIASTOLIC BLOOD PRESSURE: 58 MMHG | SYSTOLIC BLOOD PRESSURE: 93 MMHG

## 2021-09-06 VITALS — DIASTOLIC BLOOD PRESSURE: 59 MMHG | SYSTOLIC BLOOD PRESSURE: 102 MMHG

## 2021-09-06 LAB
ALBUMIN SERPL-MCNC: 2.2 G/DL (ref 3.4–5)
ALT SERPL-CCNC: 188 U/L (ref 16–63)
ANION GAP SERPL CALC-SCNC: 5 MMOL/L (ref 7–16)
AST SERPL-CCNC: 35 U/L (ref 15–37)
BASOPHILS NFR BLD AUTO: 0.4 % (ref 0–2)
BILIRUB SERPL-MCNC: 0.2 MG/DL (ref 0.2–1)
BUN SERPL-MCNC: 17 MG/DL (ref 7–18)
CALCIUM SERPL-MCNC: 8.8 MG/DL (ref 8.5–10.1)
CHLORIDE SERPL-SCNC: 103 MMOL/L (ref 98–107)
CO2 SERPL-SCNC: 28 MMOL/L (ref 21–32)
CREAT SERPL-MCNC: 0.9 MG/DL (ref 0.7–1.3)
EOSINOPHIL NFR BLD: 0.2 % (ref 0–3)
ERYTHROCYTE [DISTWIDTH] IN BLOOD BY AUTOMATED COUNT: 15.1 % (ref 10.5–14.5)
GLUCOSE SERPL-MCNC: 104 MG/DL (ref 74–106)
GRANULOCYTES NFR BLD MANUAL: 76.3 % (ref 36–66)
HCT VFR BLD CALC: 35.5 % (ref 42–52)
HGB BLD-MCNC: 11.3 GM/DL (ref 14–18)
LYMPHOCYTES NFR BLD AUTO: 17.3 % (ref 24–44)
MAGNESIUM SERPL-MCNC: 2 MG/DL (ref 1.8–2.4)
MCH RBC QN AUTO: 26.5 PG (ref 26–34)
MCHC RBC AUTO-ENTMCNC: 31.8 G/DL (ref 28–37)
MCV RBC: 83.3 FL (ref 80–100)
MONOCYTES NFR BLD: 5.8 % (ref 1–8)
NEUTROPHILS # BLD: 11.5 THOU/UL (ref 1.4–8.2)
PHOSPHATE SERPL-MCNC: 3.8 MG/DL (ref 2.6–4.7)
PLATELET # BLD: 316 THOU/UL (ref 150–400)
POTASSIUM SERPL-SCNC: 4.1 MMOL/L (ref 3.5–5.1)
PROT SERPL-MCNC: 6.1 G/DL (ref 6.4–8.2)
RBC # BLD AUTO: 4.26 MIL/UL (ref 4.5–6)
SODIUM SERPL-SCNC: 136 MMOL/L (ref 136–145)
WBC # BLD AUTO: 15.1 THOU/UL (ref 4–11)

## 2021-09-06 NOTE — NUR
PT HAD STEADY DAY, ABLE TO BE ON ROOM AIR WITH EXCEPTION OF USING BSC, THEN
USED 3L VIA NC FOR SUPPLEMENTATION.
PT HAD NO OTHER COMPLAINTS OR CONCERNS DURING SHIFT.

## 2021-09-07 VITALS — SYSTOLIC BLOOD PRESSURE: 84 MMHG | DIASTOLIC BLOOD PRESSURE: 53 MMHG

## 2021-09-07 VITALS — DIASTOLIC BLOOD PRESSURE: 56 MMHG | SYSTOLIC BLOOD PRESSURE: 108 MMHG

## 2021-09-07 VITALS — DIASTOLIC BLOOD PRESSURE: 59 MMHG | SYSTOLIC BLOOD PRESSURE: 92 MMHG

## 2021-09-07 VITALS — DIASTOLIC BLOOD PRESSURE: 61 MMHG | SYSTOLIC BLOOD PRESSURE: 98 MMHG

## 2021-09-07 VITALS — DIASTOLIC BLOOD PRESSURE: 59 MMHG | SYSTOLIC BLOOD PRESSURE: 98 MMHG

## 2021-09-07 NOTE — NUR
PROGRESS
 
PT A/O X4. UP WITH SBA TO BSC. CHEST TUBE TO RIGHT FLANK INTACT ATRIUM TO
WATERSEAL. SOME BUBBLING AND TIDALING NOTED. DRESSING INTACT WITH TUBE STILL
AT CORRECT MARKING TAPE IS SECURE. INCISION TO RIGHT SCAPULA ENDY WITH INTACT
SUTURES AND DERMABOND IN PLACE, INCISION IS WELL APPROXIMATED WITH NO
DRAINAGE, REDNESS OR S/S OF INFECTION. PT DENIES PAIN. VOIDING QS. HAD SOME
DONNY CRACKERS AND MILK BEFORE BED. DENIES PAIN. TELEMETRY INTACT READING SR.

## 2021-09-07 NOTE — NUR
continues on room air tonight. resting quietly after taking the hydrocodone
for back pain. no discharge concerns voiced.

## 2021-09-07 NOTE — NUR
CHEST TUBE TO WATER SEAL NOW PER DR. RIVERA, PATENT, INTACT AND IN PLACE.
CHEST TUBE MONITOR EVERY 2 HOURS AND NO SIGNS OF DISTRESS NOTED. PT ON ROOM
AIR, DENIES ANY SOB. USES URINAL. OFF ISOLATION NOW PER ANTONIO OWUSU.
ANTICIPATING FOR D/C TO REHAB.

## 2021-09-08 VITALS — SYSTOLIC BLOOD PRESSURE: 91 MMHG | DIASTOLIC BLOOD PRESSURE: 57 MMHG

## 2021-09-08 VITALS — DIASTOLIC BLOOD PRESSURE: 67 MMHG | SYSTOLIC BLOOD PRESSURE: 111 MMHG

## 2021-09-08 VITALS — DIASTOLIC BLOOD PRESSURE: 62 MMHG | SYSTOLIC BLOOD PRESSURE: 103 MMHG

## 2021-09-08 VITALS — SYSTOLIC BLOOD PRESSURE: 103 MMHG | DIASTOLIC BLOOD PRESSURE: 64 MMHG

## 2021-09-08 VITALS — SYSTOLIC BLOOD PRESSURE: 105 MMHG | DIASTOLIC BLOOD PRESSURE: 59 MMHG

## 2021-09-08 NOTE — NUR
JESSE reviewed chart and spoke with nursing and attending physician. Enhanced
Isolation precautions have been discontinued. Chest tube clamped this morning.
Pt is not requiring O2. [facility] is following for possible admission to in acute
rehab. SW spoke with pt via phone to provide update and discuss discharge
plan. Pt states he would like to discuss the discharge disposition with the
physicians. JESSE explained possible options: [facility] v. Home with HH. Pt verbalized
understanding. [employer] has applied for [place] Medicaid. JESSE contacted [employer]
[employer] to confirm status of Medicaid application. Awaiting input from [employer]
[employer] at this time. JESSE is following to assist as needed with discharge
planning.

## 2021-09-08 NOTE — NUR
REPORT CALLED TO [facility]. PT WILL TRANSFER ON BED. PT WILL HAVE ALL BELONGINGS
TRANSFERED WITH PT. PT VERBALIZED UNDERSTANDING FOR TRANSFER.

## 2021-09-08 NOTE — NUR
PT CHEST TUBE CLAMPED PER DR. RIVERA, PT TOLERATING IT WELL. O2 SAT BTWN
90-93%, ON 2L OF O2 WITH ACTIVITY. NO SIGNS OF RESPIRATORY DISTRESS NOTED. OFF
ISOLATION. ANTICIPATING FOR D/C TO REHABD SOON. DENIES ANY NEEDS COURTNEY, WILL
CONTINUE TO MONITOR

## 2021-09-08 NOTE — NUR
CHEST TUBE TAKEN OUY BY DR. RIVERA, PT TOLERATED IT WELL. XRAY DONE. NO SIGNS
OF DITRESS NOTED. WILL CONTINUE TO MONITOR

## 2021-09-08 NOTE — NUR
PT SITTING IN BED WATCHING TV. CHEERFUL. DISCUSSING PLAN TO ATTEND REHAB
TOMORROW. LUNGS CLEAR UPPER LOBES, CREPITUS BY PRIOR CHEST TUBE SITE.
DRESSINGS INTACT AND DRY ON UPPER AND MID BACK. PT CALLS FOR ASSISTANCE. PT
USES O2 PER NC WITH ADLS AND AMBULATION. SNACK PROVIDED.

## 2021-09-09 VITALS — SYSTOLIC BLOOD PRESSURE: 108 MMHG | DIASTOLIC BLOOD PRESSURE: 63 MMHG

## 2021-09-09 VITALS — DIASTOLIC BLOOD PRESSURE: 64 MMHG | SYSTOLIC BLOOD PRESSURE: 110 MMHG

## 2021-09-09 VITALS — SYSTOLIC BLOOD PRESSURE: 108 MMHG | DIASTOLIC BLOOD PRESSURE: 65 MMHG

## 2021-09-09 VITALS — DIASTOLIC BLOOD PRESSURE: 66 MMHG | SYSTOLIC BLOOD PRESSURE: 114 MMHG

## 2021-09-09 VITALS — DIASTOLIC BLOOD PRESSURE: 51 MMHG | SYSTOLIC BLOOD PRESSURE: 96 MMHG

## 2021-09-09 VITALS — DIASTOLIC BLOOD PRESSURE: 61 MMHG | SYSTOLIC BLOOD PRESSURE: 106 MMHG

## 2021-09-09 NOTE — NUR
Patient transferred to CCU from [facility], [place]. Patient with tenative dc today. Sp with
acute rehab. Patient too high level for acute rehab. Spoke with [facility] for
lolita visits. They are able to accept patient for service. Request
Sat/Excercise for possible home oxygen.

## 2021-09-09 NOTE — NUR
ADMITTED THIS PATIENT FROM 3[facility] ON 9/8/21 AROUND 2300H.PATIENT IS ALERT AND
ORIENTED X4.ON ROOM AIR BREATHING SPONTANEOUSLY, USES OXYGEN PER NC WITH ADLS
AND AMBULATION.WITH POST CHEST TUBE INSERTION SITE COVERED WITH DRESSING
C/D/I.NOT IN PAIN OR DISTRESS.KEPT ON PULSE OXIMETER TO MONITOR OXYGEN
SATURATION.ALL NEEDS ATTENDED.

## 2021-09-10 VITALS — SYSTOLIC BLOOD PRESSURE: 111 MMHG | DIASTOLIC BLOOD PRESSURE: 65 MMHG

## 2021-09-10 VITALS — DIASTOLIC BLOOD PRESSURE: 66 MMHG | SYSTOLIC BLOOD PRESSURE: 116 MMHG

## 2021-09-10 VITALS — DIASTOLIC BLOOD PRESSURE: 58 MMHG | SYSTOLIC BLOOD PRESSURE: 102 MMHG

## 2021-09-10 VITALS — SYSTOLIC BLOOD PRESSURE: 94 MMHG | DIASTOLIC BLOOD PRESSURE: 87 MMHG

## 2021-09-10 VITALS — DIASTOLIC BLOOD PRESSURE: 74 MMHG | SYSTOLIC BLOOD PRESSURE: 117 MMHG

## 2021-09-10 VITALS — DIASTOLIC BLOOD PRESSURE: 70 MMHG | SYSTOLIC BLOOD PRESSURE: 103 MMHG

## 2021-09-10 VITALS — SYSTOLIC BLOOD PRESSURE: 110 MMHG | DIASTOLIC BLOOD PRESSURE: 62 MMHG

## 2021-09-10 NOTE — NUR
Patient to dc home. His wife will transport home. Arranged home oxygen with
[facility]. 2 liters with activity. [facility]  accepting for lolita visits
Faxed orders. Discussed above with patient no further needs.

## 2021-09-10 NOTE — NUR
TODAY THIS PT HAS BEEN NSR WITH STABLE VS AND NO STATED PAIN. HE HAS HAD A
STABLE BLOOD SUGAR SO NO INSULIN GIVEN. HE HAS OTHERWISE BEEN ASLEEP FOR MOST
OF THE NIGHT BESIDES USING THE URINAL AT THE BEDSIDE.

## 2021-09-22 ENCOUNTER — HOSPITAL ENCOUNTER (OUTPATIENT)
Dept: HOSPITAL 35 - RAD | Age: 56
End: 2021-09-22
Attending: THORACIC SURGERY (CARDIOTHORACIC VASCULAR SURGERY)
Payer: COMMERCIAL

## 2021-09-22 DIAGNOSIS — J93.83: ICD-10-CM

## 2021-09-22 DIAGNOSIS — J98.11: ICD-10-CM

## 2021-09-22 DIAGNOSIS — J43.9: Primary | ICD-10-CM
